# Patient Record
Sex: FEMALE | Race: OTHER | HISPANIC OR LATINO | URBAN - METROPOLITAN AREA
[De-identification: names, ages, dates, MRNs, and addresses within clinical notes are randomized per-mention and may not be internally consistent; named-entity substitution may affect disease eponyms.]

---

## 2017-01-20 ENCOUNTER — EMERGENCY (EMERGENCY)
Facility: HOSPITAL | Age: 37
LOS: 1 days | Discharge: HOME | End: 2017-01-20
Admitting: EMERGENCY MEDICINE
Payer: SELF-PAY

## 2017-01-20 DIAGNOSIS — R05 COUGH: ICD-10-CM

## 2017-01-20 DIAGNOSIS — J45.901 UNSPECIFIED ASTHMA WITH (ACUTE) EXACERBATION: ICD-10-CM

## 2017-01-20 DIAGNOSIS — J20.9 ACUTE BRONCHITIS, UNSPECIFIED: ICD-10-CM

## 2017-01-20 DIAGNOSIS — Z79.899 OTHER LONG TERM (CURRENT) DRUG THERAPY: ICD-10-CM

## 2017-01-20 PROCEDURE — 99284 EMERGENCY DEPT VISIT MOD MDM: CPT

## 2017-01-20 PROCEDURE — 71020: CPT | Mod: 26

## 2017-01-21 PROCEDURE — 94640 AIRWAY INHALATION TREATMENT: CPT

## 2017-01-21 PROCEDURE — 99284 EMERGENCY DEPT VISIT MOD MDM: CPT | Mod: 25

## 2017-01-21 PROCEDURE — 96372 THER/PROPH/DIAG INJ SC/IM: CPT

## 2017-01-21 PROCEDURE — 81025 URINE PREGNANCY TEST: CPT

## 2017-01-21 PROCEDURE — 71046 X-RAY EXAM CHEST 2 VIEWS: CPT

## 2017-05-17 ENCOUNTER — APPOINTMENT (OUTPATIENT)
Dept: PULMONOLOGY | Facility: CLINIC | Age: 37
End: 2017-05-17

## 2017-05-17 VITALS
BODY MASS INDEX: 29.45 KG/M2 | TEMPERATURE: 98.2 F | HEART RATE: 75 BPM | DIASTOLIC BLOOD PRESSURE: 76 MMHG | WEIGHT: 156 LBS | RESPIRATION RATE: 16 BRPM | HEIGHT: 61 IN | SYSTOLIC BLOOD PRESSURE: 110 MMHG | OXYGEN SATURATION: 98 %

## 2017-05-17 DIAGNOSIS — J45.909 UNSPECIFIED ASTHMA, UNCOMPLICATED: ICD-10-CM

## 2017-05-17 DIAGNOSIS — Z82.5 FAMILY HISTORY OF ASTHMA AND OTHER CHRONIC LOWER RESPIRATORY DISEASES: ICD-10-CM

## 2017-05-17 RX ORDER — FLUTICASONE PROPIONATE AND SALMETEROL XINAFOATE 115; 21 UG/1; UG/1
115-21 AEROSOL, METERED RESPIRATORY (INHALATION)
Qty: 1 | Refills: 3 | Status: ACTIVE | COMMUNITY
Start: 2017-05-17 | End: 1900-01-01

## 2017-05-17 RX ORDER — MONTELUKAST 10 MG/1
10 TABLET, FILM COATED ORAL
Qty: 30 | Refills: 6 | Status: ACTIVE | COMMUNITY
Start: 2017-05-17 | End: 1900-01-01

## 2017-05-17 RX ORDER — METHYLPREDNISOLONE 4 MG/1
4 TABLET ORAL
Qty: 21 | Refills: 0 | Status: COMPLETED | COMMUNITY
Start: 2017-05-08

## 2017-05-17 RX ORDER — FLUTICASONE PROPIONATE 50 UG/1
50 SPRAY, METERED NASAL
Qty: 16 | Refills: 0 | Status: ACTIVE | COMMUNITY
Start: 2017-05-08

## 2017-05-17 RX ORDER — ALBUTEROL 90 MCG
90 AEROSOL (GRAM) INHALATION
Refills: 0 | Status: ACTIVE | COMMUNITY

## 2017-05-23 ENCOUNTER — EMERGENCY (EMERGENCY)
Facility: HOSPITAL | Age: 37
LOS: 1 days | Discharge: HOME | End: 2017-05-23
Admitting: EMERGENCY MEDICINE
Payer: COMMERCIAL

## 2017-05-23 DIAGNOSIS — R06.00 DYSPNEA, UNSPECIFIED: ICD-10-CM

## 2017-05-23 DIAGNOSIS — J98.01 ACUTE BRONCHOSPASM: ICD-10-CM

## 2017-05-23 DIAGNOSIS — J45.909 UNSPECIFIED ASTHMA, UNCOMPLICATED: ICD-10-CM

## 2017-05-23 DIAGNOSIS — Z79.899 OTHER LONG TERM (CURRENT) DRUG THERAPY: ICD-10-CM

## 2017-05-23 DIAGNOSIS — J40 BRONCHITIS, NOT SPECIFIED AS ACUTE OR CHRONIC: ICD-10-CM

## 2017-05-23 PROCEDURE — 99284 EMERGENCY DEPT VISIT MOD MDM: CPT | Mod: 25

## 2017-05-24 PROCEDURE — 85027 COMPLETE CBC AUTOMATED: CPT

## 2017-05-24 PROCEDURE — 96365 THER/PROPH/DIAG IV INF INIT: CPT

## 2017-05-24 PROCEDURE — 94640 AIRWAY INHALATION TREATMENT: CPT

## 2017-05-24 PROCEDURE — 99284 EMERGENCY DEPT VISIT MOD MDM: CPT | Mod: 25

## 2017-05-24 PROCEDURE — 80048 BASIC METABOLIC PNL TOTAL CA: CPT

## 2017-05-24 PROCEDURE — 96368 THER/DIAG CONCURRENT INF: CPT

## 2017-10-28 ENCOUNTER — APPOINTMENT (OUTPATIENT)
Dept: ULTRASOUND IMAGING | Facility: HOSPITAL | Age: 37
End: 2017-10-28
Payer: COMMERCIAL

## 2017-10-28 ENCOUNTER — OUTPATIENT (OUTPATIENT)
Dept: OUTPATIENT SERVICES | Facility: HOSPITAL | Age: 37
LOS: 1 days | End: 2017-10-28
Payer: COMMERCIAL

## 2017-10-28 DIAGNOSIS — R10.9 UNSPECIFIED ABDOMINAL PAIN: ICD-10-CM

## 2017-10-28 DIAGNOSIS — Z00.8 ENCOUNTER FOR OTHER GENERAL EXAMINATION: ICD-10-CM

## 2017-10-28 PROCEDURE — 76700 US EXAM ABDOM COMPLETE: CPT

## 2017-10-28 PROCEDURE — 76700 US EXAM ABDOM COMPLETE: CPT | Mod: 26

## 2018-03-08 ENCOUNTER — APPOINTMENT (OUTPATIENT)
Dept: OTOLARYNGOLOGY | Facility: CLINIC | Age: 38
End: 2018-03-08
Payer: MEDICAID

## 2018-03-08 VITALS
WEIGHT: 156 LBS | BODY MASS INDEX: 29.45 KG/M2 | HEIGHT: 61 IN | SYSTOLIC BLOOD PRESSURE: 119 MMHG | DIASTOLIC BLOOD PRESSURE: 58 MMHG | HEART RATE: 72 BPM

## 2018-03-08 DIAGNOSIS — R68.89 OTHER GENERAL SYMPTOMS AND SIGNS: ICD-10-CM

## 2018-03-08 DIAGNOSIS — K21.9 GASTRO-ESOPHAGEAL REFLUX DISEASE W/OUT ESOPHAGITIS: ICD-10-CM

## 2018-03-08 DIAGNOSIS — J34.89 OTHER SPECIFIED DISORDERS OF NOSE AND NASAL SINUSES: ICD-10-CM

## 2018-03-08 DIAGNOSIS — F45.8 OTHER SOMATOFORM DISORDERS: ICD-10-CM

## 2018-03-08 DIAGNOSIS — R09.81 NASAL CONGESTION: ICD-10-CM

## 2018-03-08 PROCEDURE — 31231 NASAL ENDOSCOPY DX: CPT

## 2018-03-08 PROCEDURE — 99204 OFFICE O/P NEW MOD 45 MIN: CPT | Mod: 25

## 2018-03-08 PROCEDURE — 92557 COMPREHENSIVE HEARING TEST: CPT

## 2018-03-08 PROCEDURE — 92567 TYMPANOMETRY: CPT

## 2018-03-08 RX ORDER — FLUOCINONIDE 0.5 MG/ML
0.05 SOLUTION TOPICAL
Qty: 60 | Refills: 0 | Status: ACTIVE | COMMUNITY
Start: 2017-10-03

## 2018-03-08 RX ORDER — FLUTICASONE PROPIONATE 50 UG/1
50 SPRAY, METERED NASAL
Qty: 1 | Refills: 3 | Status: ACTIVE | COMMUNITY
Start: 2018-03-08 | End: 1900-01-01

## 2018-03-08 RX ORDER — KETOCONAZOLE 20.5 MG/ML
2 SHAMPOO, SUSPENSION TOPICAL
Qty: 120 | Refills: 0 | Status: ACTIVE | COMMUNITY
Start: 2017-10-03

## 2018-03-08 RX ORDER — RANITIDINE 150 MG/1
150 TABLET ORAL
Qty: 90 | Refills: 2 | Status: ACTIVE | COMMUNITY
Start: 2018-03-08 | End: 1900-01-01

## 2018-07-24 ENCOUNTER — FORM ENCOUNTER (OUTPATIENT)
Age: 38
End: 2018-07-24

## 2018-07-25 ENCOUNTER — OUTPATIENT (OUTPATIENT)
Dept: OUTPATIENT SERVICES | Facility: HOSPITAL | Age: 38
LOS: 1 days | End: 2018-07-25
Payer: COMMERCIAL

## 2018-07-25 ENCOUNTER — APPOINTMENT (OUTPATIENT)
Dept: MRI IMAGING | Facility: HOSPITAL | Age: 38
End: 2018-07-25
Payer: COMMERCIAL

## 2018-07-25 DIAGNOSIS — M23.91 UNSPECIFIED INTERNAL DERANGEMENT OF RIGHT KNEE: ICD-10-CM

## 2018-07-25 PROCEDURE — 73721 MRI JNT OF LWR EXTRE W/O DYE: CPT | Mod: 26,RT

## 2018-07-25 PROCEDURE — 73721 MRI JNT OF LWR EXTRE W/O DYE: CPT

## 2018-08-02 PROBLEM — M25.561 CHRONIC PAIN OF RIGHT KNEE: Status: ACTIVE | Noted: 2018-08-02

## 2018-08-08 ENCOUNTER — OUTPATIENT (OUTPATIENT)
Dept: OUTPATIENT SERVICES | Facility: HOSPITAL | Age: 38
LOS: 1 days | End: 2018-08-08
Payer: COMMERCIAL

## 2018-08-08 ENCOUNTER — APPOINTMENT (OUTPATIENT)
Dept: RADIOLOGY | Facility: HOSPITAL | Age: 38
End: 2018-08-08

## 2018-08-08 ENCOUNTER — APPOINTMENT (OUTPATIENT)
Dept: ORTHOPEDIC SURGERY | Facility: HOSPITAL | Age: 38
End: 2018-08-08

## 2018-08-08 VITALS
SYSTOLIC BLOOD PRESSURE: 113 MMHG | DIASTOLIC BLOOD PRESSURE: 74 MMHG | BODY MASS INDEX: 31.72 KG/M2 | WEIGHT: 168 LBS | HEART RATE: 58 BPM | HEIGHT: 61 IN

## 2018-08-08 DIAGNOSIS — M23.300 OTHER MENISCUS DERANGEMENTS, UNSPECIFIED LATERAL MENISCUS, RIGHT KNEE: ICD-10-CM

## 2018-08-08 DIAGNOSIS — M17.11 UNILATERAL PRIMARY OSTEOARTHRITIS, RIGHT KNEE: ICD-10-CM

## 2018-08-08 DIAGNOSIS — M25.561 PAIN IN RIGHT KNEE: ICD-10-CM

## 2018-08-08 DIAGNOSIS — G89.29 PAIN IN RIGHT KNEE: ICD-10-CM

## 2018-08-08 PROCEDURE — 73564 X-RAY EXAM KNEE 4 OR MORE: CPT | Mod: 26,RT

## 2018-08-15 ENCOUNTER — OUTPATIENT (OUTPATIENT)
Dept: OUTPATIENT SERVICES | Facility: HOSPITAL | Age: 38
LOS: 1 days | End: 2018-08-15

## 2018-08-15 VITALS
RESPIRATION RATE: 16 BRPM | OXYGEN SATURATION: 99 % | DIASTOLIC BLOOD PRESSURE: 64 MMHG | SYSTOLIC BLOOD PRESSURE: 112 MMHG | WEIGHT: 166.01 LBS | HEART RATE: 62 BPM | HEIGHT: 62 IN | TEMPERATURE: 98 F

## 2018-08-15 DIAGNOSIS — S83.241A OTHER TEAR OF MEDIAL MENISCUS, CURRENT INJURY, RIGHT KNEE, INITIAL ENCOUNTER: ICD-10-CM

## 2018-08-15 DIAGNOSIS — Z98.891 HISTORY OF UTERINE SCAR FROM PREVIOUS SURGERY: Chronic | ICD-10-CM

## 2018-08-15 DIAGNOSIS — J45.909 UNSPECIFIED ASTHMA, UNCOMPLICATED: ICD-10-CM

## 2018-08-15 DIAGNOSIS — Z91.013 ALLERGY TO SEAFOOD: ICD-10-CM

## 2018-08-15 DIAGNOSIS — S83.249A OTHER TEAR OF MEDIAL MENISCUS, CURRENT INJURY, UNSPECIFIED KNEE, INITIAL ENCOUNTER: ICD-10-CM

## 2018-08-15 LAB
HCT VFR BLD CALC: 37.3 % — SIGNIFICANT CHANGE UP (ref 34.5–45)
HGB BLD-MCNC: 12.3 G/DL — SIGNIFICANT CHANGE UP (ref 11.5–15.5)
MCHC RBC-ENTMCNC: 29.3 PG — SIGNIFICANT CHANGE UP (ref 27–34)
MCHC RBC-ENTMCNC: 33 % — SIGNIFICANT CHANGE UP (ref 32–36)
MCV RBC AUTO: 88.8 FL — SIGNIFICANT CHANGE UP (ref 80–100)
NRBC # FLD: 0 — SIGNIFICANT CHANGE UP
PLATELET # BLD AUTO: 342 K/UL — SIGNIFICANT CHANGE UP (ref 150–400)
PMV BLD: 10.2 FL — SIGNIFICANT CHANGE UP (ref 7–13)
RBC # BLD: 4.2 M/UL — SIGNIFICANT CHANGE UP (ref 3.8–5.2)
RBC # FLD: 12.7 % — SIGNIFICANT CHANGE UP (ref 10.3–14.5)
WBC # BLD: 7.84 K/UL — SIGNIFICANT CHANGE UP (ref 3.8–10.5)
WBC # FLD AUTO: 7.84 K/UL — SIGNIFICANT CHANGE UP (ref 3.8–10.5)

## 2018-08-15 NOTE — H&P PST ADULT - NEUROLOGICAL DETAILS
alert and oriented x 3/responds to verbal commands/sensation intact/normal strength/responds to pain

## 2018-08-15 NOTE — H&P PST ADULT - VISION (WITH CORRECTIVE LENSES IF THE PATIENT USUALLY WEARS THEM):
Corrective lens/Partially impaired: cannot see medication labels or newsprint, but can see obstacles in path, and the surrounding layout; can count fingers at arm's length

## 2018-08-15 NOTE — H&P PST ADULT - HISTORY OF PRESENT ILLNESS
37 year old female presents to [facility] for tear of medial meniscus right knee.  Pt is scheduled for right arthroscopy of the knee on 8/21/18.

## 2018-08-15 NOTE — H&P PST ADULT - NEGATIVE GASTROINTESTINAL SYMPTOMS
no melena/no constipation/no diarrhea/no change in bowel habits/no nausea/no vomiting/no abdominal pain

## 2018-08-15 NOTE — H&P PST ADULT - NSANTHOSAYNRD_GEN_A_CORE
No. NIKO screening performed.  STOP BANG Legend: 0-2 = LOW Risk; 3-4 = INTERMEDIATE Risk; 5-8 = HIGH Risk/never been tested

## 2018-08-15 NOTE — H&P PST ADULT - FAMILY HISTORY
Father  Still living? Yes, Estimated age: 71-80  Hypertension, Age at diagnosis: Age Unknown     Mother  Still living? Yes, Estimated age: 61-70  Hypertension, Age at diagnosis: Age Unknown  Family history of stroke, Age at diagnosis: Age Unknown

## 2018-08-15 NOTE — H&P PST ADULT - RS GEN PE MLT RESP DETAILS PC
airway patent/no rhonchi/breath sounds equal/good air movement/no rales/clear to auscultation bilaterally/respirations non-labored/no wheezes

## 2018-08-15 NOTE — H&P PST ADULT - NEGATIVE ENMT SYMPTOMS
no tinnitus/no nasal discharge/no nasal congestion/no ear pain/no throat pain/no dysphagia/no hearing difficulty/no vertigo/no sinus symptoms

## 2018-08-15 NOTE — H&P PST ADULT - NEGATIVE GENERAL GENITOURINARY SYMPTOMS
no dysuria/no renal colic/no incontinence/no nocturia/no urinary hesitancy/normal urinary frequency/no bladder infections/no hematuria

## 2018-08-15 NOTE — H&P PST ADULT - PROBLEM SELECTOR PLAN 1
Pt scheduled for right arthroscopy of the knee on 8/21/18.   Pre-op instructions given, patient verbalized understanding.   Pepcid given to patient for GI prophylaxis.   Chlorohexidine wash provided, instructions given.  Pt instructed to bring urine sample the morning of the surgery.

## 2018-08-20 ENCOUNTER — TRANSCRIPTION ENCOUNTER (OUTPATIENT)
Age: 38
End: 2018-08-20

## 2018-08-21 ENCOUNTER — OUTPATIENT (OUTPATIENT)
Dept: OUTPATIENT SERVICES | Facility: HOSPITAL | Age: 38
LOS: 1 days | Discharge: HOME | End: 2018-08-21

## 2018-08-21 VITALS
HEART RATE: 71 BPM | OXYGEN SATURATION: 98 % | DIASTOLIC BLOOD PRESSURE: 65 MMHG | RESPIRATION RATE: 14 BRPM | SYSTOLIC BLOOD PRESSURE: 115 MMHG

## 2018-08-21 VITALS
HEART RATE: 56 BPM | DIASTOLIC BLOOD PRESSURE: 65 MMHG | RESPIRATION RATE: 18 BRPM | TEMPERATURE: 98 F | OXYGEN SATURATION: 98 % | WEIGHT: 166.01 LBS | HEIGHT: 62 IN | SYSTOLIC BLOOD PRESSURE: 116 MMHG

## 2018-08-21 DIAGNOSIS — Z98.891 HISTORY OF UTERINE SCAR FROM PREVIOUS SURGERY: Chronic | ICD-10-CM

## 2018-08-21 DIAGNOSIS — S83.249A OTHER TEAR OF MEDIAL MENISCUS, CURRENT INJURY, UNSPECIFIED KNEE, INITIAL ENCOUNTER: ICD-10-CM

## 2018-08-21 RX ORDER — ASPIRIN/CALCIUM CARB/MAGNESIUM 324 MG
1 TABLET ORAL
Qty: 30 | Refills: 0
Start: 2018-08-21 | End: 2018-09-19

## 2018-08-21 NOTE — ASU DISCHARGE PLAN (ADULT/PEDIATRIC). - NOTIFY
Swelling that continues/Bleeding that does not stop/Pain not relieved by Medications/Numbness, color, or temperature change to extremity/Fever greater than 101/Persistent Nausea and Vomiting

## 2018-08-21 NOTE — ASU DISCHARGE PLAN (ADULT/PEDIATRIC). - NURSING INSTRUCTIONS
Read and follow doctor's post op instructions.  May weight bear on leg, may flex leg.  Remove dressing after 48 hours and apply band aids to portal.  Apply ice to knee.  May shower once dressing is removed.  Call and make follow up appointment.

## 2018-09-06 ENCOUNTER — APPOINTMENT (OUTPATIENT)
Dept: OTOLARYNGOLOGY | Facility: CLINIC | Age: 38
End: 2018-09-06

## 2018-09-17 ENCOUNTER — RESULT REVIEW (OUTPATIENT)
Age: 38
End: 2018-09-17

## 2018-09-18 PROBLEM — S83.241A OTHER TEAR OF MEDIAL MENISCUS, CURRENT INJURY, RIGHT KNEE, INITIAL ENCOUNTER: Chronic | Status: ACTIVE | Noted: 2018-08-15

## 2018-09-18 PROBLEM — J45.909 UNSPECIFIED ASTHMA, UNCOMPLICATED: Chronic | Status: ACTIVE | Noted: 2018-08-15

## 2018-10-03 ENCOUNTER — OUTPATIENT (OUTPATIENT)
Dept: OUTPATIENT SERVICES | Facility: HOSPITAL | Age: 38
LOS: 1 days | End: 2018-10-03
Payer: COMMERCIAL

## 2018-10-03 ENCOUNTER — APPOINTMENT (OUTPATIENT)
Dept: ULTRASOUND IMAGING | Facility: HOSPITAL | Age: 38
End: 2018-10-03
Payer: COMMERCIAL

## 2018-10-03 ENCOUNTER — APPOINTMENT (OUTPATIENT)
Dept: RADIOLOGY | Facility: HOSPITAL | Age: 38
End: 2018-10-03
Payer: COMMERCIAL

## 2018-10-03 DIAGNOSIS — Z00.8 ENCOUNTER FOR OTHER GENERAL EXAMINATION: ICD-10-CM

## 2018-10-03 DIAGNOSIS — Z98.891 HISTORY OF UTERINE SCAR FROM PREVIOUS SURGERY: Chronic | ICD-10-CM

## 2018-10-03 PROCEDURE — 76830 TRANSVAGINAL US NON-OB: CPT

## 2018-10-03 PROCEDURE — 76856 US EXAM PELVIC COMPLETE: CPT

## 2018-10-03 PROCEDURE — 72220 X-RAY EXAM SACRUM TAILBONE: CPT | Mod: 26

## 2018-10-03 PROCEDURE — 72220 X-RAY EXAM SACRUM TAILBONE: CPT

## 2018-10-03 PROCEDURE — 76830 TRANSVAGINAL US NON-OB: CPT | Mod: 26

## 2018-10-03 PROCEDURE — 76856 US EXAM PELVIC COMPLETE: CPT | Mod: 26

## 2018-10-22 ENCOUNTER — OUTPATIENT (OUTPATIENT)
Dept: OUTPATIENT SERVICES | Facility: HOSPITAL | Age: 38
LOS: 1 days | End: 2018-10-22
Payer: COMMERCIAL

## 2018-10-22 ENCOUNTER — APPOINTMENT (OUTPATIENT)
Dept: CT IMAGING | Facility: HOSPITAL | Age: 38
End: 2018-10-22
Payer: COMMERCIAL

## 2018-10-22 DIAGNOSIS — Z98.891 HISTORY OF UTERINE SCAR FROM PREVIOUS SURGERY: Chronic | ICD-10-CM

## 2018-10-22 DIAGNOSIS — Z00.8 ENCOUNTER FOR OTHER GENERAL EXAMINATION: ICD-10-CM

## 2018-10-22 PROCEDURE — 74177 CT ABD & PELVIS W/CONTRAST: CPT | Mod: 26

## 2018-10-22 PROCEDURE — 74177 CT ABD & PELVIS W/CONTRAST: CPT

## 2018-11-07 ENCOUNTER — APPOINTMENT (OUTPATIENT)
Dept: ULTRASOUND IMAGING | Facility: HOSPITAL | Age: 38
End: 2018-11-07
Payer: COMMERCIAL

## 2018-11-07 ENCOUNTER — OUTPATIENT (OUTPATIENT)
Dept: OUTPATIENT SERVICES | Facility: HOSPITAL | Age: 38
LOS: 1 days | End: 2018-11-07
Payer: COMMERCIAL

## 2018-11-07 DIAGNOSIS — Z98.891 HISTORY OF UTERINE SCAR FROM PREVIOUS SURGERY: Chronic | ICD-10-CM

## 2018-11-07 DIAGNOSIS — Z00.8 ENCOUNTER FOR OTHER GENERAL EXAMINATION: ICD-10-CM

## 2018-11-07 PROCEDURE — 76700 US EXAM ABDOM COMPLETE: CPT | Mod: 26

## 2018-11-07 PROCEDURE — 76700 US EXAM ABDOM COMPLETE: CPT

## 2019-11-08 ENCOUNTER — APPOINTMENT (OUTPATIENT)
Dept: MRI IMAGING | Facility: HOSPITAL | Age: 39
End: 2019-11-08
Payer: COMMERCIAL

## 2019-11-08 ENCOUNTER — OUTPATIENT (OUTPATIENT)
Dept: OUTPATIENT SERVICES | Facility: HOSPITAL | Age: 39
LOS: 1 days | End: 2019-11-08
Payer: COMMERCIAL

## 2019-11-08 DIAGNOSIS — M54.32 SCIATICA, LEFT SIDE: ICD-10-CM

## 2019-11-08 DIAGNOSIS — R20.0 ANESTHESIA OF SKIN: ICD-10-CM

## 2019-11-08 DIAGNOSIS — M54.31 SCIATICA, RIGHT SIDE: ICD-10-CM

## 2019-11-08 DIAGNOSIS — Z98.891 HISTORY OF UTERINE SCAR FROM PREVIOUS SURGERY: Chronic | ICD-10-CM

## 2019-11-08 PROCEDURE — 72148 MRI LUMBAR SPINE W/O DYE: CPT | Mod: 26

## 2019-11-08 PROCEDURE — 72141 MRI NECK SPINE W/O DYE: CPT | Mod: 26

## 2019-11-08 PROCEDURE — 72148 MRI LUMBAR SPINE W/O DYE: CPT

## 2019-11-08 PROCEDURE — 72141 MRI NECK SPINE W/O DYE: CPT

## 2020-01-01 ENCOUNTER — EMERGENCY (EMERGENCY)
Facility: HOSPITAL | Age: 40
LOS: 1 days | Discharge: HOME | End: 2020-01-01
Attending: EMERGENCY MEDICINE | Admitting: EMERGENCY MEDICINE
Payer: COMMERCIAL

## 2020-01-01 VITALS
OXYGEN SATURATION: 97 % | RESPIRATION RATE: 16 BRPM | DIASTOLIC BLOOD PRESSURE: 85 MMHG | HEART RATE: 60 BPM | SYSTOLIC BLOOD PRESSURE: 126 MMHG

## 2020-01-01 VITALS
HEART RATE: 62 BPM | SYSTOLIC BLOOD PRESSURE: 155 MMHG | OXYGEN SATURATION: 97 % | HEIGHT: 64 IN | RESPIRATION RATE: 17 BRPM | WEIGHT: 165.35 LBS | DIASTOLIC BLOOD PRESSURE: 81 MMHG | TEMPERATURE: 98 F

## 2020-01-01 DIAGNOSIS — Z98.891 HISTORY OF UTERINE SCAR FROM PREVIOUS SURGERY: Chronic | ICD-10-CM

## 2020-01-01 DIAGNOSIS — R51 HEADACHE: ICD-10-CM

## 2020-01-01 LAB
ANION GAP SERPL CALC-SCNC: 9 MMOL/L — SIGNIFICANT CHANGE UP (ref 5–17)
APPEARANCE UR: CLEAR — SIGNIFICANT CHANGE UP
BASOPHILS # BLD AUTO: 0.05 K/UL — SIGNIFICANT CHANGE UP (ref 0–0.2)
BASOPHILS NFR BLD AUTO: 0.7 % — SIGNIFICANT CHANGE UP (ref 0–2)
BILIRUB UR-MCNC: NEGATIVE — SIGNIFICANT CHANGE UP
BUN SERPL-MCNC: 11 MG/DL — SIGNIFICANT CHANGE UP (ref 7–23)
CALCIUM SERPL-MCNC: 8.3 MG/DL — LOW (ref 8.4–10.5)
CHLORIDE SERPL-SCNC: 108 MMOL/L — SIGNIFICANT CHANGE UP (ref 96–108)
CO2 SERPL-SCNC: 22 MMOL/L — SIGNIFICANT CHANGE UP (ref 22–31)
COLOR SPEC: YELLOW — SIGNIFICANT CHANGE UP
CREAT SERPL-MCNC: 0.58 MG/DL — SIGNIFICANT CHANGE UP (ref 0.5–1.3)
DIFF PNL FLD: ABNORMAL
EOSINOPHIL # BLD AUTO: 0.31 K/UL — SIGNIFICANT CHANGE UP (ref 0–0.5)
EOSINOPHIL NFR BLD AUTO: 4.2 % — SIGNIFICANT CHANGE UP (ref 0–6)
GLUCOSE SERPL-MCNC: 99 MG/DL — SIGNIFICANT CHANGE UP (ref 70–99)
GLUCOSE UR QL: NEGATIVE — SIGNIFICANT CHANGE UP
HCT VFR BLD CALC: 33.5 % — LOW (ref 34.5–45)
HGB BLD-MCNC: 11.3 G/DL — LOW (ref 11.5–15.5)
IMM GRANULOCYTES NFR BLD AUTO: 0.3 % — SIGNIFICANT CHANGE UP (ref 0–1.5)
KETONES UR-MCNC: NEGATIVE — SIGNIFICANT CHANGE UP
LEUKOCYTE ESTERASE UR-ACNC: NEGATIVE — SIGNIFICANT CHANGE UP
LYMPHOCYTES # BLD AUTO: 3.71 K/UL — HIGH (ref 1–3.3)
LYMPHOCYTES # BLD AUTO: 49.7 % — HIGH (ref 13–44)
MCHC RBC-ENTMCNC: 30.1 PG — SIGNIFICANT CHANGE UP (ref 27–34)
MCHC RBC-ENTMCNC: 33.7 GM/DL — SIGNIFICANT CHANGE UP (ref 32–36)
MCV RBC AUTO: 89.1 FL — SIGNIFICANT CHANGE UP (ref 80–100)
MONOCYTES # BLD AUTO: 0.85 K/UL — SIGNIFICANT CHANGE UP (ref 0–0.9)
MONOCYTES NFR BLD AUTO: 11.4 % — SIGNIFICANT CHANGE UP (ref 2–14)
NEUTROPHILS # BLD AUTO: 2.52 K/UL — SIGNIFICANT CHANGE UP (ref 1.8–7.4)
NEUTROPHILS NFR BLD AUTO: 33.7 % — LOW (ref 43–77)
NITRITE UR-MCNC: NEGATIVE — SIGNIFICANT CHANGE UP
NRBC # BLD: 0 /100 WBCS — SIGNIFICANT CHANGE UP (ref 0–0)
PH UR: 6 — SIGNIFICANT CHANGE UP (ref 5–8)
PLATELET # BLD AUTO: 263 K/UL — SIGNIFICANT CHANGE UP (ref 150–400)
POTASSIUM SERPL-MCNC: 4 MMOL/L — SIGNIFICANT CHANGE UP (ref 3.5–5.3)
POTASSIUM SERPL-SCNC: 4 MMOL/L — SIGNIFICANT CHANGE UP (ref 3.5–5.3)
PROT UR-MCNC: NEGATIVE — SIGNIFICANT CHANGE UP
RBC # BLD: 3.76 M/UL — LOW (ref 3.8–5.2)
RBC # FLD: 13.2 % — SIGNIFICANT CHANGE UP (ref 10.3–14.5)
SODIUM SERPL-SCNC: 139 MMOL/L — SIGNIFICANT CHANGE UP (ref 135–145)
SP GR SPEC: 1.02 — SIGNIFICANT CHANGE UP (ref 1.01–1.02)
UROBILINOGEN FLD QL: NEGATIVE — SIGNIFICANT CHANGE UP
WBC # BLD: 7.46 K/UL — SIGNIFICANT CHANGE UP (ref 3.8–10.5)
WBC # FLD AUTO: 7.46 K/UL — SIGNIFICANT CHANGE UP (ref 3.8–10.5)

## 2020-01-01 PROCEDURE — 80048 BASIC METABOLIC PNL TOTAL CA: CPT

## 2020-01-01 PROCEDURE — 99284 EMERGENCY DEPT VISIT MOD MDM: CPT

## 2020-01-01 PROCEDURE — 81001 URINALYSIS AUTO W/SCOPE: CPT

## 2020-01-01 PROCEDURE — 96374 THER/PROPH/DIAG INJ IV PUSH: CPT

## 2020-01-01 PROCEDURE — 96375 TX/PRO/DX INJ NEW DRUG ADDON: CPT

## 2020-01-01 PROCEDURE — 81025 URINE PREGNANCY TEST: CPT

## 2020-01-01 PROCEDURE — 85027 COMPLETE CBC AUTOMATED: CPT

## 2020-01-01 PROCEDURE — 99284 EMERGENCY DEPT VISIT MOD MDM: CPT | Mod: 25

## 2020-01-01 RX ORDER — ONDANSETRON 8 MG/1
4 TABLET, FILM COATED ORAL ONCE
Refills: 0 | Status: COMPLETED | OUTPATIENT
Start: 2020-01-01 | End: 2020-01-01

## 2020-01-01 RX ORDER — KETOROLAC TROMETHAMINE 30 MG/ML
30 SYRINGE (ML) INJECTION ONCE
Refills: 0 | Status: DISCONTINUED | OUTPATIENT
Start: 2020-01-01 | End: 2020-01-01

## 2020-01-01 RX ORDER — SODIUM CHLORIDE 9 MG/ML
1000 INJECTION INTRAMUSCULAR; INTRAVENOUS; SUBCUTANEOUS ONCE
Refills: 0 | Status: COMPLETED | OUTPATIENT
Start: 2020-01-01 | End: 2020-01-01

## 2020-01-01 RX ORDER — IBUPROFEN 200 MG
1 TABLET ORAL
Qty: 30 | Refills: 0
Start: 2020-01-01

## 2020-01-01 RX ORDER — ACETAMINOPHEN 500 MG
975 TABLET ORAL ONCE
Refills: 0 | Status: COMPLETED | OUTPATIENT
Start: 2020-01-01 | End: 2020-01-01

## 2020-01-01 RX ORDER — MECLIZINE HCL 12.5 MG
25 TABLET ORAL ONCE
Refills: 0 | Status: COMPLETED | OUTPATIENT
Start: 2020-01-01 | End: 2020-01-01

## 2020-01-01 RX ORDER — MECLIZINE HCL 12.5 MG
1 TABLET ORAL
Qty: 20 | Refills: 0
Start: 2020-01-01

## 2020-01-01 RX ADMIN — Medication 30 MILLIGRAM(S): at 05:36

## 2020-01-01 RX ADMIN — SODIUM CHLORIDE 1000 MILLILITER(S): 9 INJECTION INTRAMUSCULAR; INTRAVENOUS; SUBCUTANEOUS at 04:58

## 2020-01-01 RX ADMIN — Medication 975 MILLIGRAM(S): at 05:36

## 2020-01-01 RX ADMIN — ONDANSETRON 4 MILLIGRAM(S): 8 TABLET, FILM COATED ORAL at 04:57

## 2020-01-01 RX ADMIN — Medication 30 MILLIGRAM(S): at 04:58

## 2020-01-01 RX ADMIN — Medication 25 MILLIGRAM(S): at 04:58

## 2020-01-01 RX ADMIN — Medication 975 MILLIGRAM(S): at 04:58

## 2020-01-01 NOTE — ED PROVIDER NOTE - NSFOLLOWUPINSTRUCTIONS_ED_ALL_ED_FT
Desvanecimiento    LO QUE NECESITA SABER:    El desvanecimiento es la sensación de que se puede desmayar, wali no es así. Lisa latido cardíaco puede ser rápido o sentir que lisa corazón le está aleteando. La sensación de desvanecimiento puede ocurrir cuando se heaven ciertos medicamentos, wade aquellos para bajar lisa presión arterial. Otras causas comunes son la deshidratación, un bajón de sodio o de azúcar en la noah, un ritmo cardíaco anormal y la ansiedad.    INSTRUCCIONES SOBRE EL [facility]ITAL[facility]:    Regrese a la reanna de emergencias si:    Tiene dolor repentino en el pecho.      Usted tiene dificultad para respirar o le falta el aire.      Usted tiene cambios en la visión, está sudando y tiene náuseas mientras está sentado o acostado.      Usted está acalorado y siente las palpitaciones de lisa corazón.      Se desmaya.    Comuníquese con lisa médico si:    Se siente mareado con frecuencia.      Lisa corazón late más rápido o más despacio que lo acostumbrado.      Usted tiene preguntas o inquietudes acerca de lisa condición o cuidado.    Acuda a krista consultas de control con lisa médico según le indicaron.Usted podría necesitar de exámenes adicionales para averiguar la causa de krista desvanecimiento o aturdimiento. Los exámenes ayudarán a lisa médico a planificar el mejor tratamiento para usted. Anote krista preguntas para que se acuerde de hacerlas malachi krista visitas.    Cuidados personales:Dialogue con lisa médico sobre las siguientes recomendaciones y otras formas de controlar krista síntomas:    Recuéstesecuando tenga la sensación de desvanecimiento, al sentir que lisa garganta se está cerrando o lisa visión cambia. Eleve krista piernas por encima del nivel de lisa corazón.      Póngase de pie lentamente.Siéntese en el borde de la cama o del sofá por unos minutos antes de ponerse de pie.      Respire lenta y profundamente cuando se sienta mareado.[place] puede ayudar a disminuir la sensación de que se va a desmayar.      Pregunte si es necesario evitar las duchas o tinas calientes y saunas.Estos podrían empeorar krista síntomas.    Esté atento a los signos de bajo nivel de azúcar en la noah:Los signos incluyen hambre, nerviosismo, sudoración y latidos cardíacos rápidos o palpitantes. Consulte con lisa médico sobre métodos para mantener estable lisa nivel de azúcar en la noah.    Revise lisa presión arterial con frecuencia.Usted debe hacer ésto especialmente si manjinder medicamentos para bajar la presión arterial. Revise lisa presión arterial cuando esté acostado y cuando esté de pie. Pregunte con que frecuencia debe tomarse la presión malachi el día. Mantenga un registro de los valores numéricos de lisa presión arterial. Lisa médico puede usar la información del registro wade shima base para planificar lisa tratamiento.    Mantenga un registro de los episodios de la sensación de desvanecimiento:Incluya los síntomas y la actividad que realiza antes y después del episodio. El registro puede asistir a lisa médico a determinar la causa de lisa debilidad y ayudarlo a controlar krista episodios.    Dolor de ita regular    LO QUE NECESITA SABER:    El dolor de ita puede ser leve o intenso. Las causas comunes incluyen el estrés, medicamentos y lesiones en la ita. Problemas de sueño, alergias y cambios hormonales también pueden causar renae de ita. Puede que usted sufra de renae de ita frecuentes sin que se conozca lisa causa. Es probable que el dolor empiece en otra parte de lisa cuerpo y pase a lisa ita. El dolor de ita también puede moverse hacia otras partes de lisa cuerpo. Un dolor de ita puede causar otros síntomas, wade náusea y vómito. Un dolor de ita martha puede incluso ser shima señal de derrame cerebral u otro problema tc que necesita de tratamiento inmediato.    INSTRUCCIONES SOBRE [facility]ITAL[facility]IA:    Llame al 911 en isabelle de presentar lo siguiente:    Usted tiene alguno de los siguientes signos de derrame cerebral:   Adormecimiento o caída de un lado de lisa aby      Debilidad en un brazo o shima pierna      Confusión o debilidad para hablar      Mareos o dolor de ita intenso, o pérdida de la visión.        Regrese a la reanna de emergencias si:    Usted tiene un dolor de ita con rigidez de alisia y fiebre.      Usted tiene un dolor de ita nino y está vomitando.      Usted tiene dolor severo que no se nu después de ankita krista medicamentos para el dolor.      Usted tiene dolor de ita y el dolor empeora cuando usted héctor hacia la clemente.      Usted tiene dolor de ita y cambios en la vista, wade visión borrosa.      Usted tiene dolor de ita y está olvidadizo o confundido.    Comuníquese con lisa médico si:    Usted tiene dolor de ita todos los días y no se nu aun después de tratarlo.      Krista renae de ita cambian u ocurren nuevos síntomas cuando tiene dolor de ita.      Otras personas con las que usted vive o trabaja también tienen renae de ita.      Usted tiene preguntas o inquietudes acerca de lisa condición o cuidado.    Medicamentos:Es posible que usted necesite alguno de los siguientes:     Los medicamentosPueden darse medicamentospara prevenir o tratar el dolor de ita. No espere hasta que el dolor sea severo para ankita lisa medicamento. Pregunte al médico cómo debe ankita isabella medicamento de forma deng.      Los ROSCOE,wade el ibuprofeno, ayudan a disminuir la inflamación, el dolor y la fiebre. Isabella medicamento está disponible con o sin shima receta médica. Los ROSCOE pueden causar sangrado estomacal o problemas renales en ciertas personas. Si usted esta tomando un anticoágulante, siempre pregunte si los AINEs son seguros para usted. Siempre javid la etiqueta de isabella medicamento y siga las instrucciones. No administre isabella medicamento a niños menores de 6 meses de jonathon sin antes obtener la autorización de lisa médico.      Acetaminofénalivia el dolor y baja la fiebre. Está disponible sin receta médica. Pregunte la cantidad y la frecuencia con que debe tomarlos. Siga las indicaciones. Javid las etiquetas de todos los demás medicamentos que esté usando para saber si también contienen acetaminofén, o pregunte a lisa médico o farmacéutico. El acetaminofén puede causar daño en el hígado cuando no se manjinder de forma correcta. No use más de 4 gramos (4000 miligramos) en total de acetaminofeno en un día.      Los medicamentos contra las náuseaspueden darse para calmar lisa estómago y ayudar a prevenir los vómitos.      [place] krista medicamentos wade se le haya indicado.Consulte con lisa médico si usted loraine que lisa medicamento no le está ayudando o si presenta efectos secundarios. Infórmele si es alérgico a algún medicamento. Mantenga shima lista actualizada de los medicamentos, las vitaminas y los productos herbales que manjinder. Incluya los siguientes datos de los medicamentos: cantidad, frecuencia y motivo de administración. Traiga con usted la lista o los envases de las píldoras a krista citas de seguimiento. Lleve la lista de los medicamentos con usted en isabelle de shima emergencia.    El manejo de krista síntomas:    Descanse en shima habitación oscura y tranquila.[place] wade consecuencia podría ayudar a disminuir lisa dolor.      Aplique calor o hielo según lo indicado.El calor o el hielo pueden ayudar a disminuir el dolor o los espasmos musculares. Aplíquese calor o hielo en el área por 20 minutos cada 2 horas por tantos días wade se lo recomienden. Es probable que lisa médico le recomiende que alterne entre calor y hielo.      Relaje krista músculos para ayudar a aliviar lisa dolor de ita.Acuéstese en shima posición cómoda y cierre krista ojos. Relaje krista músculos lentamente. Comience por los dedos de los pies y avance hacia arriba al meño de lisa cuerpo. Un masaje o baño en agua tibia también pueden ayudar a relajar krista músculos.    Mantenga un registro de krista renae de ita:Escriba en el diario las fechas y las horas en que usted sufre un dolor de ita y lo que estaba haciendo antes de que empezara. Registre también lo que comió y bebió malachi las 24 horas previas a que comenzara el dolor de ita. [place] puede ayudar a lisa médico a encontrar la causa de krista renae de ita y así poder crear un plan de tratamiento. Krista anotaciones también pueden ayudarle a evitar lo que provoca krista renae de ita o manejar krista síntomas.    Duerma suficiente:Usted debería dormir entre 8 y 10 horas cada noche. Establezca un horario para dormir. Acuéstese y levántese a la misma hora todos los días. Puede resultarle útil hacer algo relajante antes de acostarse. No belinda televisión antes de acostarse.    No fume:La nicotina y otras sustancias químicas en los cigarrillos y cigarros pueden provocar un dolor de ita tensional o empeorarlo. Pida información a lisa médico si usted actualmente fuma y necesita ayuda para dejar de fumar. Los cigarrillos electrónicos o el tabaco sin humo igualmente contienen nicotina. Consulte con lisa médico antes de utilizar estos productos.    [place] líquidos según krista indicaciones:Es probable que usted necesite ankita más líquido para prevenir la deshidratación. La deshidratación puede causar renae de ita. Pregunte a lisa médico sobre la cantidad de líquido que necesita ankita todos los jaya y cuáles le recomienda.    Limite la cafeína y las bebidas alcohólicas según se le haya indicado:Krista renae de ita pueden ser causados por la cafeína o el alcohol. Es probable que usted también desarrolle un dolor de ita si manjinder cafeína regularmente y myesha de tomarla repentinamente.    Consuma alimentos saludables y variados:No se salte ninguna comida. [place] demasiado poco puede causar un dolor de ita. Incluya frutas, vegetales, panes integrales, productos lácteos bajos en grasas, frijoles, keith magras y pescado. No coma alimentos que provoquen krista renae de ita, wade chocolate y vino tinto. Alimentos que contienen gluten, nitratos, monosodio glutamato (MSG) o azúcares artificiales también pueden llegar a causar un dolor de ita.    Acuda a krista consultas de control con lisa médico según le indicaron.Anote krista preguntas para que se acuerde de hacerlas malachi krista visitas.

## 2020-01-01 NOTE — ED PROVIDER NOTE - PATIENT PORTAL LINK FT
You can access the FollowMyHealth Patient Portal offered by [facility] by registering at the following website: [email or web address]. By joining [facility]’s FollowMyHealth portal, you will also be able to view your health information using other applications (apps) compatible with our system.

## 2020-01-01 NOTE — ED PROVIDER NOTE - CARE PROVIDER_API CALL
Adalid Johnson)  Neurology  [address] [place] [address], [address]  [place] [address]  Phone: [phone]  Fax: [phone]  Follow Up Time: 1-3 Days

## 2020-01-01 NOTE — ED PROVIDER NOTE - CLINICAL SUMMARY MEDICAL DECISION MAKING FREE TEXT BOX
headache, dizziness.  with normal neuro exam/ vitals. ?bppv.  r/o anemia, dehydration, electrolyte abnormality. check labs. give iv fluids, zofran, meclizine. reassess headache, dizziness.  with normal neuro exam/ vitals. ?bppv.  r/o anemia, dehydration, electrolyte abnormality. check labs. give iv fluids, zofran, meclizine. reassess    0630. labs unremarkable. pt feels much better, like "a new person". well appearing

## 2020-01-01 NOTE — ED PROVIDER NOTE - OBJECTIVE STATEMENT
pt c/o generalized headache, moderate, at 1am today assoc c dizziness, lightheaded and also vertiginous. assoc c nausea, vomited x 1 nbnb. no fever/chills. no cp/sob. no weakness, numbness, speech or vision changes. no head trauma

## 2020-01-01 NOTE — ED ADULT NURSE NOTE - OBJECTIVE STATEMENT
Patient w/[facility] presents to ED complaining of headache with nausea and difficulty breathing since approximately 1 am. Patient verbalizes one episode of vomiting approximately 2 hours ago. Patient denies any recent sick contacts.

## 2020-01-25 ENCOUNTER — APPOINTMENT (OUTPATIENT)
Dept: MRI IMAGING | Facility: HOSPITAL | Age: 40
End: 2020-01-25

## 2020-02-24 NOTE — ASU PREOP CHECKLIST - TYPE OF SOLUTION
LR@30cc/hr O-L Flap Text: The defect edges were debeveled with a #15 scalpel blade.  Given the location of the defect, shape of the defect and the proximity to free margins an O-L flap was deemed most appropriate.  Using a sterile surgical marker, an appropriate advancement flap was drawn incorporating the defect and placing the expected incisions within the relaxed skin tension lines where possible.    The area thus outlined was incised deep to adipose tissue with a #15 scalpel blade.  The skin margins were undermined to an appropriate distance in all directions utilizing iris scissors.

## 2020-03-10 ENCOUNTER — RESULT REVIEW (OUTPATIENT)
Age: 40
End: 2020-03-10

## 2021-03-22 ENCOUNTER — APPOINTMENT (OUTPATIENT)
Dept: MRI IMAGING | Facility: HOSPITAL | Age: 41
End: 2021-03-22
Payer: COMMERCIAL

## 2021-03-22 ENCOUNTER — APPOINTMENT (OUTPATIENT)
Dept: RADIOLOGY | Facility: HOSPITAL | Age: 41
End: 2021-03-22
Payer: COMMERCIAL

## 2021-03-22 ENCOUNTER — OUTPATIENT (OUTPATIENT)
Dept: OUTPATIENT SERVICES | Facility: HOSPITAL | Age: 41
LOS: 1 days | End: 2021-03-22
Payer: COMMERCIAL

## 2021-03-22 DIAGNOSIS — Z98.891 HISTORY OF UTERINE SCAR FROM PREVIOUS SURGERY: Chronic | ICD-10-CM

## 2021-03-22 DIAGNOSIS — Z00.8 ENCOUNTER FOR OTHER GENERAL EXAMINATION: ICD-10-CM

## 2021-03-22 PROCEDURE — 71046 X-RAY EXAM CHEST 2 VIEWS: CPT

## 2021-03-22 PROCEDURE — 70551 MRI BRAIN STEM W/O DYE: CPT | Mod: 26

## 2021-03-22 PROCEDURE — 71046 X-RAY EXAM CHEST 2 VIEWS: CPT | Mod: 26

## 2021-03-22 PROCEDURE — 70551 MRI BRAIN STEM W/O DYE: CPT

## 2021-03-26 ENCOUNTER — APPOINTMENT (OUTPATIENT)
Dept: CARDIOLOGY | Facility: CLINIC | Age: 41
End: 2021-03-26
Payer: COMMERCIAL

## 2021-03-26 VITALS
SYSTOLIC BLOOD PRESSURE: 109 MMHG | TEMPERATURE: 98.2 F | WEIGHT: 168 LBS | DIASTOLIC BLOOD PRESSURE: 74 MMHG | HEART RATE: 75 BPM | OXYGEN SATURATION: 98 % | RESPIRATION RATE: 16 BRPM | HEIGHT: 61 IN | BODY MASS INDEX: 31.72 KG/M2

## 2021-03-26 DIAGNOSIS — R07.9 CHEST PAIN, UNSPECIFIED: ICD-10-CM

## 2021-03-26 DIAGNOSIS — R00.2 PALPITATIONS: ICD-10-CM

## 2021-03-26 PROCEDURE — 93000 ELECTROCARDIOGRAM COMPLETE: CPT

## 2021-03-26 PROCEDURE — 99072 ADDL SUPL MATRL&STAF TM PHE: CPT

## 2021-03-26 PROCEDURE — 99203 OFFICE O/P NEW LOW 30 MIN: CPT

## 2021-03-26 NOTE — PHYSICAL EXAM
[General Appearance - Well Developed] : well developed [Normal Appearance] : normal appearance [Well Groomed] : well groomed [General Appearance - Well Nourished] : well nourished [No Deformities] : no deformities [General Appearance - In No Acute Distress] : no acute distress [Normal Conjunctiva] : the conjunctiva exhibited no abnormalities [Eyelids - No Xanthelasma] : the eyelids demonstrated no xanthelasmas [Normal Oral Mucosa] : normal oral mucosa [No Oral Pallor] : no oral pallor [No Oral Cyanosis] : no oral cyanosis [Normal Jugular Venous A Waves Present] : normal jugular venous A waves present [Normal Jugular Venous V Waves Present] : normal jugular venous V waves present [No Jugular Venous Romero A Waves] : no jugular venous romero A waves [Heart Rate And Rhythm] : heart rate and rhythm were normal [Heart Sounds] : normal S1 and S2 [Murmurs] : no murmurs present [Respiration, Rhythm And Depth] : normal respiratory rhythm and effort [Exaggerated Use Of Accessory Muscles For Inspiration] : no accessory muscle use [Auscultation Breath Sounds / Voice Sounds] : lungs were clear to auscultation bilaterally [Abdomen Soft] : soft [Abdomen Tenderness] : non-tender [Abdomen Mass (___ Cm)] : no abdominal mass palpated [Abnormal Walk] : normal gait [Gait - Sufficient For Exercise Testing] : the gait was sufficient for exercise testing [Nail Clubbing] : no clubbing of the fingernails [Cyanosis, Localized] : no localized cyanosis [Petechial Hemorrhages (___cm)] : no petechial hemorrhages [Skin Color & Pigmentation] : normal skin color and pigmentation [] : no rash [No Venous Stasis] : no venous stasis [Skin Lesions] : no skin lesions [No Skin Ulcers] : no skin ulcer [No Xanthoma] : no  xanthoma was observed [Oriented To Time, Place, And Person] : oriented to person, place, and time [Affect] : the affect was normal [Mood] : the mood was normal [No Anxiety] : not feeling anxious

## 2021-03-26 NOTE — REASON FOR VISIT
[Consultation] : a consultation regarding [Chest Pain] : chest pain [Coronary Artery Disease] : coronary artery disease [FreeTextEntry1] : Carole  comes today for clarification of her  overall cardiovascular risk. she was advised to undergo a complete cardiac evaluation. she denies current chest pains shortness of breath or loss of consciousnes. how would she does describe recent chest pains across her anterior chest.\par The quality of the pain is localized to the anterior chest. The severity is mild to moderate.\par The  duration and  the timing  is short lived in  the context of progressive weight gain.\par there are no Modifying factors or associated signs and symptoms. she admits to anxiety. her mother suffered myocardial infarction and stroke and a clot to her brain . there is a strong family history\par \par [employer]  [id number]

## 2021-03-26 NOTE — DISCUSSION/SUMMARY
[FreeTextEntry1] : I have asked Carole to undergo detailed cardiac testing in order to evaluate her overall cardiovascular risk. An assessment of both structural and functional heart disease was recommended to the patient.\par In this regard, a stress test was advised to the patient.I await the upcoming noninvasive cardiac testing in order to assess her overall cardiovascular risk.We discussed the pros and cons of plain treadmill stress testing nuclear stress testing and angiography including a sensitivity analysis.We would consider advancing towards a cardiac CTA based upon the plain stress test result. she prefers to avoid radiation but asks for the best test possible\par \par We have reviewed the current ACC guidelines and using the pooled cohort equation his cardiac risk over the near term 10 years is  to be determined    \par \par More than half of the face to face encounter of 45 minutes   was spent in counseling the patient with respect to  cardiovascular risk\par \par Quality measures \par Tobacco intervention not indicated\par Statin for prevention of cardiovascular disease to be determined\par Hypertension compensated\par Aspirin for ischemic vascular disease to be determined\par Tobacco screening cessation and intervention not indicated\par \par Medical necessity\par This is a high encounter based upon two or more chronic illnesses with mild exacerbation requiring further management and evaluation.   .\par \par \par \par \par

## 2021-04-01 ENCOUNTER — APPOINTMENT (OUTPATIENT)
Dept: OTOLARYNGOLOGY | Facility: CLINIC | Age: 41
End: 2021-04-01
Payer: COMMERCIAL

## 2021-04-01 VITALS
HEART RATE: 74 BPM | WEIGHT: 168 LBS | SYSTOLIC BLOOD PRESSURE: 122 MMHG | OXYGEN SATURATION: 98 % | DIASTOLIC BLOOD PRESSURE: 74 MMHG | BODY MASS INDEX: 31.72 KG/M2 | TEMPERATURE: 97.3 F | HEIGHT: 61 IN

## 2021-04-01 DIAGNOSIS — H92.01 OTALGIA, RIGHT EAR: ICD-10-CM

## 2021-04-01 DIAGNOSIS — J35.8 OTHER CHRONIC DISEASES OF TONSILS AND ADENOIDS: ICD-10-CM

## 2021-04-01 DIAGNOSIS — H93.11 TINNITUS, RIGHT EAR: ICD-10-CM

## 2021-04-01 PROCEDURE — 99072 ADDL SUPL MATRL&STAF TM PHE: CPT

## 2021-04-01 PROCEDURE — 99204 OFFICE O/P NEW MOD 45 MIN: CPT

## 2021-04-01 NOTE — CONSULT LETTER
[Dear  ___] : Dear  [unfilled], [Consult Letter:] : I had the pleasure of evaluating your patient, [unfilled]. [Please see my note below.] : Please see my note below. [Consult Closing:] : Thank you very much for allowing me to participate in the care of this patient.  If you have any questions, please do not hesitate to contact me. [Sincerely,] : Sincerely, [FreeTextEntry2] : Juan Antonio Rizo MD ([place], [place])\par  [FreeTextEntry3] : Memo Dowd MD, FACS\par Chief of Otolaryngology [facility]\par  - Dept. of Otolaryngology\par [employer]\par \par

## 2021-04-01 NOTE — PHYSICAL EXAM
[Midline] : trachea located in midline position [de-identified] : 3+ and cryptic [Normal] : no rashes

## 2021-04-01 NOTE — DATA REVIEWED
[de-identified] : \par EXAM: MR BRAIN\par \par \par PROCEDURE DATE: 03/22/2021\par \par \par \par INTERPRETATION: INDICATIONS: Finding on patient's prior cervical MR 11/8/2019 at the level of the magali. Patient has vertigo\par \par TECHNIQUE: Multiplanar imaging was performed using T1 weighted, T2 weighted and FLAIR sequences. Diffusion weighted and SWI images were obtained.\par \par COMPARISON EXAMINATION: Cervical MR 11/8/2019\par \par FINDINGS:\par Ventricles and sulci: Normal.\par Intra-axial: Mixed signal T1 mixed signal T2 with a rim of hemosiderin on susceptibility weighted imaging at the mid level of the magali similar in appearance to that seen 11/20/2019 consistent with probable cavernous malformation at this level. 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 Mar 22 2021 4:50PM

## 2021-04-01 NOTE — REVIEW OF SYSTEMS
[Ear Pain] : ear pain [Hearing Loss] : hearing loss [Sinus Pain] : sinus pain [Sinus Pressure] : sinus pressure [Throat Pain] : throat pain [Swollen Glands] : swollen glands [Negative] : Heme/Lymph

## 2021-04-01 NOTE — HISTORY OF PRESENT ILLNESS
[Vertigo] : vertigo [Dizziness] : dizziness [Hearing Loss] : hearing loss [de-identified] : Ms. MCINTOSH is a 40 year female who presents with loss of hearing R>L with some discomfort in the right for the last 1-2 years.  She saw Dr. Magana in 2018 for tinnitus in the right ear but reports not having the tinnitus in that ear anymore.  She reports left ear noise a few years back.  She used peroxide to clean her ear with improvement.  She also reports having enlarged tonsils and sometimes there are debris on her tonsils that she would sometimes clean with a q-tip.  She also reports a bad odor from her throat and nose.  No history of having a hearing test.  \par \par She also reports having an MRI of her brain in March 22 for Vertigo.  She reports having vertigo symptoms every day.

## 2021-04-01 NOTE — REASON FOR VISIT
[[employer] ] : provided by [employer]   [Source: ______] : History obtained from [unfilled] [FreeTextEntry1] : [id number] [FreeTextEntry2] : Liana [TWNoteComboBox1] : [place]

## 2021-04-09 ENCOUNTER — APPOINTMENT (OUTPATIENT)
Dept: CARDIOLOGY | Facility: CLINIC | Age: 41
End: 2021-04-09
Payer: COMMERCIAL

## 2021-04-09 PROCEDURE — 99072 ADDL SUPL MATRL&STAF TM PHE: CPT

## 2021-04-09 PROCEDURE — 93015 CV STRESS TEST SUPVJ I&R: CPT

## 2021-04-27 ENCOUNTER — EMERGENCY (EMERGENCY)
Facility: HOSPITAL | Age: 41
LOS: 1 days | Discharge: HOME | End: 2021-04-27
Attending: EMERGENCY MEDICINE | Admitting: EMERGENCY MEDICINE
Payer: COMMERCIAL

## 2021-04-27 VITALS
HEART RATE: 66 BPM | TEMPERATURE: 98 F | SYSTOLIC BLOOD PRESSURE: 132 MMHG | HEIGHT: 64 IN | WEIGHT: 171.96 LBS | RESPIRATION RATE: 16 BRPM | OXYGEN SATURATION: 100 % | DIASTOLIC BLOOD PRESSURE: 82 MMHG

## 2021-04-27 DIAGNOSIS — Z98.891 HISTORY OF UTERINE SCAR FROM PREVIOUS SURGERY: Chronic | ICD-10-CM

## 2021-04-27 PROCEDURE — 90471 IMMUNIZATION ADMIN: CPT

## 2021-04-27 PROCEDURE — 90715 TDAP VACCINE 7 YRS/> IM: CPT

## 2021-04-27 PROCEDURE — 99283 EMERGENCY DEPT VISIT LOW MDM: CPT | Mod: 25

## 2021-04-27 PROCEDURE — 99284 EMERGENCY DEPT VISIT MOD MDM: CPT

## 2021-04-27 RX ORDER — TETANUS TOXOID, REDUCED DIPHTHERIA TOXOID AND ACELLULAR PERTUSSIS VACCINE, ADSORBED 5; 2.5; 8; 8; 2.5 [IU]/.5ML; [IU]/.5ML; UG/.5ML; UG/.5ML; UG/.5ML
0.5 SUSPENSION INTRAMUSCULAR ONCE
Refills: 0 | Status: COMPLETED | OUTPATIENT
Start: 2021-04-27 | End: 2021-04-27

## 2021-04-27 RX ADMIN — TETANUS TOXOID, REDUCED DIPHTHERIA TOXOID AND ACELLULAR PERTUSSIS VACCINE, ADSORBED 0.5 MILLILITER(S): 5; 2.5; 8; 8; 2.5 SUSPENSION INTRAMUSCULAR at 22:31

## 2021-04-27 NOTE — ED ADULT NURSE NOTE - OBJECTIVE STATEMENT
Left shin puncture wound from a nail that was on the floor. Patient states nail was gisselle, last tetanus unknown.

## 2021-04-27 NOTE — ED PROVIDER NOTE - PATIENT PORTAL LINK FT
You can access the FollowMyHealth Patient Portal offered by [facility] by registering at the following website: [email or web address]. By joining [employer]’s FollowMyHealth portal, you will also be able to view your health information using other applications (apps) compatible with our system.

## 2021-04-27 NOTE — ED PROVIDER NOTE - TOBACCO USE
Plan:  Restart lactulose, 5 ml daily  If Yumi is still straining with bowel movements, please call and will try Senna (colonic stimulant)    Continue pantoprazole twice daily for now    Continue nutramigen, please add additional feeding of 185 ml ( up to 6 feedings per day)    Wait before trying a baby food by mouth until next week.  
Never smoker

## 2021-04-27 NOTE — ED PROVIDER NOTE - CLINICAL SUMMARY MEDICAL DECISION MAKING FREE TEXT BOX
pt p/w bleeding from small wound s/p nail injury to left lower leg, minor puncture , resolved bleeding, wound was cleaned and band aid was applied and Tdap    was updated

## 2021-04-27 NOTE — ED PROVIDER NOTE - OBJECTIVE STATEMENT
41 y/o F with no sig PMHX presents to ed c/o bleeding from small punctured wound on left knee from nail injury, pt kneeled on while removing nails from furniture, tdap more than 13 years ago

## 2021-04-27 NOTE — ED PROVIDER NOTE - NSFOLLOWUPINSTRUCTIONS_ED_ALL_ED_FT
keep wound dry and clean  change dressing daily, apply bacitracin  look for signs of infection as explained  like worsening redness,  swelling, pain or purulent discharge  take Tyelnol for pain  Follow up with your doctor in 2 days   Return to ER if any concern or problem

## 2021-04-29 ENCOUNTER — APPOINTMENT (OUTPATIENT)
Dept: DERMATOLOGY | Facility: CLINIC | Age: 41
End: 2021-04-29

## 2021-05-24 ENCOUNTER — APPOINTMENT (OUTPATIENT)
Dept: NEUROSURGERY | Facility: CLINIC | Age: 41
End: 2021-05-24
Payer: COMMERCIAL

## 2021-05-24 DIAGNOSIS — D18.00 HEMANGIOMA UNSPECIFIED SITE: ICD-10-CM

## 2021-05-24 PROCEDURE — 99203 OFFICE O/P NEW LOW 30 MIN: CPT

## 2021-05-24 PROCEDURE — 99072 ADDL SUPL MATRL&STAF TM PHE: CPT

## 2021-05-25 VITALS
HEART RATE: 76 BPM | DIASTOLIC BLOOD PRESSURE: 64 MMHG | SYSTOLIC BLOOD PRESSURE: 100 MMHG | OXYGEN SATURATION: 98 % | RESPIRATION RATE: 16 BRPM

## 2021-05-25 PROBLEM — D18.00 CAVERNOMA: Status: ACTIVE | Noted: 2021-05-25

## 2021-05-25 NOTE — HISTORY OF PRESENT ILLNESS
[> 3 months] : more  than 3 months [FreeTextEntry1] : dizziness [de-identified] : Patient with intermittent episodes of dizziness for the past four years. Episodes occur 3-4 times a week and last less than 1 minute. No LOC, facial numbness, double vision, n/v, vision changes or weakness are noted during these episodes. No reported falls.\par \par Patient lives an active life. Works full time as a . Lives with her  and three sons in a private home in [place]. Drives without difficulty.

## 2021-05-25 NOTE — DATA REVIEWED
[de-identified] : \par \par EXAM: MR BRAIN\par \par \par PROCEDURE DATE: 03/22/2021\par \par \par \par INTERPRETATION: INDICATIONS: Finding on patient's prior cervical MR 11/8/2019 at the level of the magali. Patient has vertigo\par \par TECHNIQUE: Multiplanar imaging was performed using T1 weighted, T2 weighted and FLAIR sequences. Diffusion weighted and SWI images were obtained.\par \par COMPARISON EXAMINATION: Cervical MR 11/8/2019\par \par FINDINGS:\par Ventricles and sulci: Normal.\par Intra-axial: Mixed signal T1 mixed signal T2 with a rim of hemosiderin on susceptibility weighted imaging at the mid level of the magali similar in appearance to that seen 11/20/2019 consistent with probable cavernous malformation at this level. 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 Mar 22 2021 4:50PM

## 2021-05-25 NOTE — REASON FOR VISIT
[New Patient Visit] : a new patient visit [[employer] ] : provided by [employer]   [FreeTextEntry1] : [id number] [TWNoteComboBox1] : [place]

## 2021-05-25 NOTE — PLAN
[FreeTextEntry1] : Discussion:\par - Reviewed MRI results. Cavernoma, benign and congenital. No changes in lesions compared to prior imaging. No cerebral edema noted.\par - Patient is asymptomatic.\par - Neurosurgical intervention is not indicated. Extremely high risk for poor outcome with any intervention (surgery/Gamma Knife).\par - Patient's memory is not affected by these cavernomas. \par - Referral to ENT for vertigo evaluation and treatment.\par - Follow-up with MRI in 1 year with OV.

## 2022-03-23 ENCOUNTER — APPOINTMENT (OUTPATIENT)
Dept: NEUROSURGERY | Facility: CLINIC | Age: 42
End: 2022-03-23

## 2022-03-23 ENCOUNTER — APPOINTMENT (OUTPATIENT)
Dept: MRI IMAGING | Facility: IMAGING CENTER | Age: 42
End: 2022-03-23

## 2022-05-19 NOTE — ED ADULT TRIAGE NOTE - ACCOMPANIED BY
"SUBJECTIVE:  Hank Wei is a 12 m.o. male who is here for a well checkup accompanied by mother.  Chief Complaint   Patient presents with    Well Child     12mos       HPI  Coming today for 12mos well child check up. Moving to [facility] in 2 days.  Current concerns include none.    Ameenas allergies, medications, history, and problem list were updated as appropriate.    Social Screening:  Family living situation/lives with: both parents  Current child-care arrangements: stays home w/ mom    Review of Systems:    Hearing/Vison:  Concerns regarding hearing? no  Concerns regarding vision?    no    Nutrition:  Current diet: wnl 2oz of formula  Difficulties with feeding/eating? no  Vitamins? no  Fluoride supplement? no    Elimination:  Stool problems? no    Sleep:  Daytime sleep problems?  no  Nighttime sleep problems? no    Developmental concerns regarding:  Hearing? no  Vision? no   Motor skills? no  Behavior/Activity? no  Developmental Milestones  Feeds self finger foods? Yes  Plays games like "peek-a-blackburn" or "pat-a-cake"? Yes  Calls parents "mama" or "kishor" or similar name? Yes  Imitates sounds? Yes  Walking? Yes  Follows simple commands? Yes  No flowsheet data found.    OBJECTIVE:  Vital signs  Vitals:    05/19/22 0906   Temp: 98.2 °F (36.8 °C)   TempSrc: Axillary   Weight: 11 kg (24 lb 5.5 oz)   Height: 2' 7.25" (0.794 m)   HC: 47.6 cm (18.75")       Physical Exam  Vitals and nursing note reviewed.   Constitutional:       Appearance: Normal appearance. He is well-developed.   HENT:      Head: Atraumatic.      Right Ear: Tympanic membrane, ear canal and external ear normal.      Left Ear: Tympanic membrane, ear canal and external ear normal.      Nose: Nose normal.      Mouth/Throat:      Mouth: Mucous membranes are moist.      Pharynx: Oropharynx is clear.   Eyes:      Extraocular Movements: Extraocular movements intact.      Conjunctiva/sclera: Conjunctivae normal.      Pupils: Pupils are equal, round, " and reactive to light.   Cardiovascular:      Rate and Rhythm: Normal rate and regular rhythm.      Pulses: Normal pulses.      Heart sounds: Normal heart sounds.   Pulmonary:      Effort: Pulmonary effort is normal.      Breath sounds: Normal breath sounds.   Abdominal:      General: Abdomen is flat. Bowel sounds are normal.      Palpations: Abdomen is soft.   Genitourinary:     Penis: Normal.       Testes: Normal.   Musculoskeletal:         General: Normal range of motion.      Cervical back: Normal range of motion and neck supple.   Lymphadenopathy:      Cervical: No cervical adenopathy.   Skin:     General: Skin is warm.      Findings: No rash.   Neurological:      General: No focal deficit present.      Mental Status: He is alert and oriented for age.            ASSESSMENT/PLAN:  Hank was seen today for well child.    Diagnoses and all orders for this visit:    Encounter for well child check without abnormal findings    Other orders  -     (In Office Administered) DTaP Vaccine (5 Pertussis Antigens) (Pediatric) (IM)  -     (In Office Administered) HiB (PRP-T) Conjugate Vaccine 4 Dose (IM)           Preventive Health Issues Addressed:  1. Anticipatory guidance discussed and a handout covering well-child issues at this age was provided.     2.. Immunizations and screening tests today: per orders.    Follow Up:  Follow up in about 3 months (around 8/19/2022).           Immediate family member

## 2022-08-17 ENCOUNTER — APPOINTMENT (OUTPATIENT)
Dept: NEUROSURGERY | Facility: CLINIC | Age: 42
End: 2022-08-17

## 2022-08-17 DIAGNOSIS — R11.0 NAUSEA: ICD-10-CM

## 2022-08-17 DIAGNOSIS — R42 DIZZINESS AND GIDDINESS: ICD-10-CM

## 2022-08-17 DIAGNOSIS — R26.89 OTHER ABNORMALITIES OF GAIT AND MOBILITY: ICD-10-CM

## 2022-08-17 DIAGNOSIS — Q28.3 OTHER MALFORMATIONS OF CEREBRAL VESSELS: ICD-10-CM

## 2022-08-17 PROCEDURE — 99214 OFFICE O/P EST MOD 30 MIN: CPT | Mod: 95

## 2022-08-17 NOTE — PHYSICAL EXAM
[General Appearance - Alert] : alert [General Appearance - In No Acute Distress] : in no acute distress [General Appearance - Well Nourished] : well nourished [General Appearance - Well Developed] : well developed [General Appearance - Well-Appearing] : healthy appearing [Oriented To Time, Place, And Person] : oriented to person, place, and time [Impaired Insight] : insight and judgment were intact [Affect] : the affect was normal [Memory Recent] : recent memory was not impaired [Person] : oriented to person [Place] : oriented to place [Time] : oriented to time [Short Term Intact] : short term memory intact [Remote Intact] : remote memory intact [Registration Intact] : recent registration memory intact [Concentration Intact] : normal concentrating ability [Fluency] : fluency intact [Comprehension] : comprehension intact [Cranial Nerves Optic (II)] : visual acuity intact bilaterally,  pupils equal round and reactive to light [Cranial Nerves Oculomotor (III)] : extraocular motion intact [Cranial Nerves Trigeminal (V)] : facial sensation intact symmetrically [Cranial Nerves Facial (VII)] : face symmetrical [Cranial Nerves Glossopharyngeal (IX)] : tongue and palate midline [Cranial Nerves Accessory (XI - Cranial And Spinal)] : head turning and shoulder shrug symmetric [Cranial Nerves Hypoglossal (XII)] : there was no tongue deviation with protrusion [Motor Tone] : muscle tone was normal in all four extremities [Motor Strength] : muscle strength was normal in all four extremities [Involuntary Movements] : no involuntary movements were seen [Motor Handedness Right-Handed] : the patient is right hand dominant [Sclera] : the sclera and conjunctiva were normal [PERRL With Normal Accommodation] : pupils were equal in size, round, reactive to light, with normal accommodation [Outer Ear] : the ears and nose were normal in appearance [Examination Of The Oral Cavity] : the lips and gums were normal [] : no respiratory distress [Abnormal Walk] : normal gait [Skin Color & Pigmentation] : normal skin color and pigmentation

## 2022-08-17 NOTE — REVIEW OF SYSTEMS
[Facial Weakness] : no facial weakness [Numbness] : no numbness [Tingling] : no tingling [Vertigo] : vertigo [Tension Headache] : tension-type headaches [Difficulty Walking] : difficulty walking [Eyesight Problems] : eyesight problems [Loss Of Hearing] : hearing loss [Negative] : Psychiatric

## 2022-08-17 NOTE — ASSESSMENT
[FreeTextEntry1] : Discussion:\par - Reviewed patient's new symptoms.\par - MRI +/- contrast\par - Follow-up OV after imaging.\par - Continue with current medications.\par - Recommend ice/heat for headache relief.\par - Will consider adding zofran after imaging if nausea continues.\par - Refer to neurology for medical management of headaches.\par - All questions answered. Contact information provided for any additional question.\par

## 2022-08-17 NOTE — HISTORY OF PRESENT ILLNESS
[Home] : at home, [unfilled] , at the time of the visit. [Medical Office: ([facility])___] : at the medical office located in  [Verbal consent obtained from patient] : the patient, [unfilled] [FreeTextEntry1] : Patient continues to have vertigo three times a week lasting for approximately 1-1.5 hours. These episodes are always associated with headache. The last three months they have been progressively worse with new nausea. They no longer respond to tylenol. They had previously responded to sitting and rest but now she needs to lie flat and close her eyes. It is affecting her daily activity, most notably her ability to work as a .\par She reports no falls but many near misses from balance difficulties during these episodes. \par \par Patient seen by ENT Nile who has referred her to Dr. Brannon.\par \par Asthma has been well controlled.\par \par

## 2022-08-31 ENCOUNTER — NON-APPOINTMENT (OUTPATIENT)
Age: 42
End: 2022-08-31

## 2022-08-31 ENCOUNTER — APPOINTMENT (OUTPATIENT)
Dept: NEUROSURGERY | Facility: CLINIC | Age: 42
End: 2022-08-31

## 2022-08-31 ENCOUNTER — OUTPATIENT (OUTPATIENT)
Dept: OUTPATIENT SERVICES | Facility: HOSPITAL | Age: 42
LOS: 1 days | End: 2022-08-31
Payer: COMMERCIAL

## 2022-08-31 ENCOUNTER — APPOINTMENT (OUTPATIENT)
Dept: MRI IMAGING | Facility: IMAGING CENTER | Age: 42
End: 2022-08-31

## 2022-08-31 DIAGNOSIS — D18.00 HEMANGIOMA UNSPECIFIED SITE: ICD-10-CM

## 2022-08-31 DIAGNOSIS — Z98.891 HISTORY OF UTERINE SCAR FROM PREVIOUS SURGERY: Chronic | ICD-10-CM

## 2022-08-31 PROCEDURE — 70553 MRI BRAIN STEM W/O & W/DYE: CPT | Mod: 26

## 2022-08-31 PROCEDURE — 99212 OFFICE O/P EST SF 10 MIN: CPT

## 2022-08-31 PROCEDURE — 70553 MRI BRAIN STEM W/O & W/DYE: CPT

## 2022-08-31 PROCEDURE — A9585: CPT

## 2022-08-31 NOTE — PHYSICAL EXAM
[General Appearance - Alert] : alert [General Appearance - In No Acute Distress] : in no acute distress [General Appearance - Well Nourished] : well nourished [General Appearance - Well Developed] : well developed [General Appearance - Well-Appearing] : healthy appearing [] : normal voice and communication [Oriented To Time, Place, And Person] : oriented to person, place, and time [Impaired Insight] : insight and judgment were intact [Affect] : the affect was normal [Mood] : the mood was normal [Person] : oriented to person [Place] : oriented to place [Time] : oriented to time [Cranial Nerves Optic (II)] : visual acuity intact bilaterally,  pupils equal round and reactive to light [Cranial Nerves Oculomotor (III)] : extraocular motion intact [Cranial Nerves Trigeminal (V)] : facial sensation intact symmetrically [Cranial Nerves Facial (VII)] : face symmetrical [Cranial Nerves Vestibulocochlear (VIII)] : hearing was intact bilaterally [Cranial Nerves Glossopharyngeal (IX)] : tongue and palate midline [Cranial Nerves Accessory (XI - Cranial And Spinal)] : head turning and shoulder shrug symmetric [Cranial Nerves Hypoglossal (XII)] : there was no tongue deviation with protrusion [Motor Tone] : muscle tone was normal in all four extremities [Motor Strength] : muscle strength was normal in all four extremities [Involuntary Movements] : no involuntary movements were seen [Sensation Tactile Decrease] : light touch was intact [Abnormal Walk] : normal gait [PERRL With Normal Accommodation] : pupils were equal in size, round, reactive to light, with normal accommodation [Extraocular Movements] : extraocular movements were intact

## 2022-08-31 NOTE — HISTORY OF PRESENT ILLNESS
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 Continues to have vertigo, but increasing numbner of episodes (approx 5x/w); however, each episode lasting only 10-15 minutes. Reports sometimes (approx 1x/w) having few seconds of difficulty balancing when walking. Patient overall reports dizziness is improving. When episodes are present, reports that onset is usually when lying down. No associated nausea or vomiting. F/u MRI reviewed w/ patient which appears grossly stable compared to prior MRI.

## 2022-08-31 NOTE — ASSESSMENT
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 Overall dizziness is improving. F/u MRI reviewed w/ patient which appears grossly stable compared to prior MRI.\par \par Plan:\par -Patient should call office in approx 2y to scheduled f/u visit\par -No acute nsgy intervention needed for cavernomas\par -All patient's questions were answered and contact information was provided.

## 2022-09-21 ENCOUNTER — OUTPATIENT (OUTPATIENT)
Dept: OUTPATIENT SERVICES | Facility: HOSPITAL | Age: 42
LOS: 1 days | End: 2022-09-21
Payer: COMMERCIAL

## 2022-09-21 ENCOUNTER — APPOINTMENT (OUTPATIENT)
Dept: RADIOLOGY | Facility: HOSPITAL | Age: 42
End: 2022-09-21

## 2022-09-21 DIAGNOSIS — Z98.891 HISTORY OF UTERINE SCAR FROM PREVIOUS SURGERY: Chronic | ICD-10-CM

## 2022-09-21 DIAGNOSIS — Z00.8 ENCOUNTER FOR OTHER GENERAL EXAMINATION: ICD-10-CM

## 2022-09-21 PROCEDURE — 73562 X-RAY EXAM OF KNEE 3: CPT | Mod: 26,LT

## 2022-09-21 PROCEDURE — 73562 X-RAY EXAM OF KNEE 3: CPT

## 2022-10-09 ENCOUNTER — EMERGENCY (EMERGENCY)
Facility: HOSPITAL | Age: 42
LOS: 1 days | Discharge: HOME | End: 2022-10-09
Attending: EMERGENCY MEDICINE | Admitting: EMERGENCY MEDICINE
Payer: COMMERCIAL

## 2022-10-09 VITALS
HEART RATE: 88 BPM | SYSTOLIC BLOOD PRESSURE: 120 MMHG | OXYGEN SATURATION: 99 % | TEMPERATURE: 99 F | RESPIRATION RATE: 18 BRPM | DIASTOLIC BLOOD PRESSURE: 78 MMHG

## 2022-10-09 VITALS
WEIGHT: 168.43 LBS | DIASTOLIC BLOOD PRESSURE: 71 MMHG | HEIGHT: 64 IN | OXYGEN SATURATION: 96 % | TEMPERATURE: 98 F | SYSTOLIC BLOOD PRESSURE: 112 MMHG | HEART RATE: 82 BPM | RESPIRATION RATE: 16 BRPM

## 2022-10-09 DIAGNOSIS — Z98.891 HISTORY OF UTERINE SCAR FROM PREVIOUS SURGERY: Chronic | ICD-10-CM

## 2022-10-09 LAB
ALBUMIN SERPL ELPH-MCNC: 3.3 G/DL — SIGNIFICANT CHANGE UP (ref 3.3–5)
ALP SERPL-CCNC: 56 U/L — SIGNIFICANT CHANGE UP (ref 40–120)
ALT FLD-CCNC: 22 U/L — SIGNIFICANT CHANGE UP (ref 10–45)
ANION GAP SERPL CALC-SCNC: 6 MMOL/L — SIGNIFICANT CHANGE UP (ref 5–17)
APPEARANCE UR: CLEAR — SIGNIFICANT CHANGE UP
AST SERPL-CCNC: 27 U/L — SIGNIFICANT CHANGE UP (ref 10–40)
BASOPHILS # BLD AUTO: 0.06 K/UL — SIGNIFICANT CHANGE UP (ref 0–0.2)
BASOPHILS NFR BLD AUTO: 0.8 % — SIGNIFICANT CHANGE UP (ref 0–2)
BILIRUB SERPL-MCNC: 0.2 MG/DL — SIGNIFICANT CHANGE UP (ref 0.2–1.2)
BILIRUB UR-MCNC: NEGATIVE — SIGNIFICANT CHANGE UP
BUN SERPL-MCNC: 7 MG/DL — SIGNIFICANT CHANGE UP (ref 7–23)
CALCIUM SERPL-MCNC: 8.8 MG/DL — SIGNIFICANT CHANGE UP (ref 8.4–10.5)
CHLORIDE SERPL-SCNC: 106 MMOL/L — SIGNIFICANT CHANGE UP (ref 96–108)
CO2 SERPL-SCNC: 26 MMOL/L — SIGNIFICANT CHANGE UP (ref 22–31)
COLOR SPEC: YELLOW — SIGNIFICANT CHANGE UP
CREAT SERPL-MCNC: 0.64 MG/DL — SIGNIFICANT CHANGE UP (ref 0.5–1.3)
DIFF PNL FLD: ABNORMAL
EGFR: 112 ML/MIN/1.73M2 — SIGNIFICANT CHANGE UP
EOSINOPHIL # BLD AUTO: 0.27 K/UL — SIGNIFICANT CHANGE UP (ref 0–0.5)
EOSINOPHIL NFR BLD AUTO: 3.7 % — SIGNIFICANT CHANGE UP (ref 0–6)
GLUCOSE SERPL-MCNC: 91 MG/DL — SIGNIFICANT CHANGE UP (ref 70–99)
GLUCOSE UR QL: NEGATIVE — SIGNIFICANT CHANGE UP
HCT VFR BLD CALC: 36.3 % — SIGNIFICANT CHANGE UP (ref 34.5–45)
HGB BLD-MCNC: 12.1 G/DL — SIGNIFICANT CHANGE UP (ref 11.5–15.5)
IMM GRANULOCYTES NFR BLD AUTO: 0.1 % — SIGNIFICANT CHANGE UP (ref 0–0.9)
KETONES UR-MCNC: NEGATIVE — SIGNIFICANT CHANGE UP
LEUKOCYTE ESTERASE UR-ACNC: NEGATIVE — SIGNIFICANT CHANGE UP
LIDOCAIN IGE QN: 109 U/L — SIGNIFICANT CHANGE UP (ref 73–393)
LYMPHOCYTES # BLD AUTO: 3.28 K/UL — SIGNIFICANT CHANGE UP (ref 1–3.3)
LYMPHOCYTES # BLD AUTO: 44.8 % — HIGH (ref 13–44)
MCHC RBC-ENTMCNC: 28.5 PG — SIGNIFICANT CHANGE UP (ref 27–34)
MCHC RBC-ENTMCNC: 33.3 GM/DL — SIGNIFICANT CHANGE UP (ref 32–36)
MCV RBC AUTO: 85.6 FL — SIGNIFICANT CHANGE UP (ref 80–100)
MONOCYTES # BLD AUTO: 0.67 K/UL — SIGNIFICANT CHANGE UP (ref 0–0.9)
MONOCYTES NFR BLD AUTO: 9.2 % — SIGNIFICANT CHANGE UP (ref 2–14)
NEUTROPHILS # BLD AUTO: 3.03 K/UL — SIGNIFICANT CHANGE UP (ref 1.8–7.4)
NEUTROPHILS NFR BLD AUTO: 41.4 % — LOW (ref 43–77)
NITRITE UR-MCNC: NEGATIVE — SIGNIFICANT CHANGE UP
NRBC # BLD: 0 /100 WBCS — SIGNIFICANT CHANGE UP (ref 0–0)
PH UR: 6 — SIGNIFICANT CHANGE UP (ref 5–8)
PLATELET # BLD AUTO: 343 K/UL — SIGNIFICANT CHANGE UP (ref 150–400)
POTASSIUM SERPL-MCNC: 4.7 MMOL/L — SIGNIFICANT CHANGE UP (ref 3.5–5.3)
POTASSIUM SERPL-SCNC: 4.7 MMOL/L — SIGNIFICANT CHANGE UP (ref 3.5–5.3)
PROT SERPL-MCNC: 7.4 G/DL — SIGNIFICANT CHANGE UP (ref 6–8.3)
PROT UR-MCNC: NEGATIVE — SIGNIFICANT CHANGE UP
RBC # BLD: 4.24 M/UL — SIGNIFICANT CHANGE UP (ref 3.8–5.2)
RBC # FLD: 13.8 % — SIGNIFICANT CHANGE UP (ref 10.3–14.5)
SODIUM SERPL-SCNC: 138 MMOL/L — SIGNIFICANT CHANGE UP (ref 135–145)
SP GR SPEC: 1.01 — SIGNIFICANT CHANGE UP (ref 1.01–1.02)
UROBILINOGEN FLD QL: NEGATIVE — SIGNIFICANT CHANGE UP
WBC # BLD: 7.32 K/UL — SIGNIFICANT CHANGE UP (ref 3.8–10.5)
WBC # FLD AUTO: 7.32 K/UL — SIGNIFICANT CHANGE UP (ref 3.8–10.5)

## 2022-10-09 PROCEDURE — 74177 CT ABD & PELVIS W/CONTRAST: CPT | Mod: MA

## 2022-10-09 PROCEDURE — 83690 ASSAY OF LIPASE: CPT

## 2022-10-09 PROCEDURE — 99285 EMERGENCY DEPT VISIT HI MDM: CPT

## 2022-10-09 PROCEDURE — 96374 THER/PROPH/DIAG INJ IV PUSH: CPT | Mod: XU

## 2022-10-09 PROCEDURE — 80053 COMPREHEN METABOLIC PANEL: CPT

## 2022-10-09 PROCEDURE — 81001 URINALYSIS AUTO W/SCOPE: CPT

## 2022-10-09 PROCEDURE — 74177 CT ABD & PELVIS W/CONTRAST: CPT | Mod: 26,MA

## 2022-10-09 PROCEDURE — 85025 COMPLETE CBC W/AUTO DIFF WBC: CPT

## 2022-10-09 PROCEDURE — 99284 EMERGENCY DEPT VISIT MOD MDM: CPT | Mod: 25

## 2022-10-09 PROCEDURE — 36415 COLL VENOUS BLD VENIPUNCTURE: CPT

## 2022-10-09 PROCEDURE — 87086 URINE CULTURE/COLONY COUNT: CPT

## 2022-10-09 RX ORDER — SODIUM CHLORIDE 9 MG/ML
1000 INJECTION INTRAMUSCULAR; INTRAVENOUS; SUBCUTANEOUS ONCE
Refills: 0 | Status: COMPLETED | OUTPATIENT
Start: 2022-10-09 | End: 2022-10-09

## 2022-10-09 RX ORDER — KETOROLAC TROMETHAMINE 30 MG/ML
30 SYRINGE (ML) INJECTION ONCE
Refills: 0 | Status: DISCONTINUED | OUTPATIENT
Start: 2022-10-09 | End: 2022-10-09

## 2022-10-09 RX ORDER — IBUPROFEN 200 MG
1 TABLET ORAL
Qty: 30 | Refills: 0
Start: 2022-10-09

## 2022-10-09 RX ADMIN — Medication 30 MILLIGRAM(S): at 20:25

## 2022-10-09 RX ADMIN — Medication 30 MILLIGRAM(S): at 21:00

## 2022-10-09 RX ADMIN — SODIUM CHLORIDE 1000 MILLILITER(S): 9 INJECTION INTRAMUSCULAR; INTRAVENOUS; SUBCUTANEOUS at 20:25

## 2022-10-09 RX ADMIN — SODIUM CHLORIDE 1000 MILLILITER(S): 9 INJECTION INTRAMUSCULAR; INTRAVENOUS; SUBCUTANEOUS at 21:00

## 2022-10-09 NOTE — ED ADULT NURSE NOTE - NSICDXPASTMEDICALHX_GEN_ALL_CORE_FT
PAST MEDICAL HISTORY:  Asthma 2014    Other tear of medial meniscus, current injury, right knee, initial encounter

## 2022-10-09 NOTE — ED PROVIDER NOTE - NSFOLLOWUPINSTRUCTIONS_ED_ALL_ED_FT
- take ibuprofen as directed for pain    Follow Up in 1-3 Days with your own doctor or with  [address] [address]  Phone: [phone]        Abdominal Pain    WHAT YOU NEED TO KNOW:    Abdominal pain can be dull, achy, or sharp. You may have pain in one area of your abdomen, or in your entire abdomen. Your pain may be caused by a condition such as constipation, food sensitivity or poisoning, infection, or a blockage. Abdominal pain can also be from a hernia, appendicitis, or an ulcer. Liver, gallbladder, or kidney conditions can also cause abdominal pain. The cause of your abdominal pain may be unknown.     DISCHARGE INSTRUCTIONS:    Return to the emergency department if:   •You have new chest pain or shortness of breath.      •You have pulsing pain in your upper abdomen or lower back that suddenly becomes constant.      •Your pain is in the right lower abdominal area and worsens with movement.       •You have a fever over 100.4°F (38°C) or shaking chills.       •You are vomiting and cannot keep food or liquids down.       •Your pain does not improve or gets worse over the next 8 to 12 hours.       •You see blood in your vomit or bowel movements, or they look black and tarry.       •Your skin or the whites of your eyes turn yellow.       •You are a woman and have a large amount of vaginal bleeding that is not your monthly period.       Contact your healthcare provider if:   •You have pain in your lower back.       •You are a man and have pain in your testicles.      •You have pain when you urinate.       •You have questions or concerns about your condition or care.      Follow up with your healthcare provider within 24 hours or as directed: Write down your questions so you remember to ask them during your visits.     Medicines:   •Medicines may be given to calm your stomach and prevent vomiting or to decrease pain. Ask how to take pain medicine safely.      •Take your medicine as directed. Contact your healthcare provider if you think your medicine is not helping or if you have side effects. Tell him of her if you are allergic to any medicine. Keep a list of the medicines, vitamins, and herbs you take. Include the amounts, and when and why you take them. Bring the list or the pill bottles to follow-up visits. Carry your medicine list with you in case of an emergency

## 2022-10-09 NOTE — ED PROVIDER NOTE - CLINICAL SUMMARY MEDICAL DECISION MAKING FREE TEXT BOX
pt c RLQ /R back pain for 8 days, worse c mvmt. mild RLQ ttp.  will check labs, ua, ct.  r/o appendicitis, renal stone. ?muscular strain.  toradol for pain. reasesss      labs, ct ok.  pain better p meds. f/u pmd

## 2022-10-09 NOTE — ED PROVIDER NOTE - PATIENT PORTAL LINK FT
You can access the FollowMyHealth Patient Portal offered by [facility] by registering at the following website: [email or web address]. By joining [employer]’s FollowMyHealth portal, you will also be able to view your health information using other applications (apps) compatible with our system.

## 2022-10-09 NOTE — ED ADULT NURSE NOTE - NSICDXFAMILYHX_GEN_ALL_CORE_FT
FAMILY HISTORY:  Father  Still living? Yes, Estimated age: 71-80  Hypertension, Age at diagnosis: Age Unknown    Mother  Still living? Yes, Estimated age: 61-70  Family history of stroke, Age at diagnosis: Age Unknown  Hypertension, Age at diagnosis: Age Unknown

## 2022-10-09 NOTE — ED PROVIDER NOTE - OBJECTIVE STATEMENT
pt c/o RLQ pain, sharp, moderate, for last 8 days, with some pain to R low back. pain worse with movement/walking. better when still. no hematuria/dysuria. no n/v/d. no fever. no recalled injury

## 2022-10-09 NOTE — ED ADULT NURSE NOTE - NSFALLRSKHARMRISK_ED_ALL_ED
Chief Complaints and History of Present Illnesses   Patient presents with     Post Op (Ophthalmology) Left Eye     Left Upper Lid Moh's Reconstruction     HPI    Affected eye(s):  Left   Symptoms:     Dryness   Itching      Frequency:  Constant       Do you have eye pain now?:  Yes   Location:  OS   Pain Level:  Mild Pain (3)   Pain Frequency:  Constant      Comments:  S/p Left Upper Lid Moh's Reconstruction 7/25/17. Eyelid feels heavy and feeling like she has been punched in the eyes. Finished with ointment. Left eye feels really dry and itchy.    Norma Talbot COT 10:35 AM August 8, 2017                 no

## 2022-10-09 NOTE — ED ADULT NURSE NOTE - CHIEF COMPLAINT QUOTE
I have pain in my groin and it goes ay back, intermittently for 1 week. Yes - the patient is able to be screened

## 2022-10-11 LAB
CULTURE RESULTS: SIGNIFICANT CHANGE UP
SPECIMEN SOURCE: SIGNIFICANT CHANGE UP

## 2023-01-01 NOTE — ED ADULT NURSE NOTE - CCCP TRG CHIEF CMPLNT
headache
Global muscle tone and symmetry normal/Joint contractures absent/Periods of alertness noted/Grossly responds to touch light and sound stimuli/Gag reflex present/Normal suck-swallow patterns for age/Cry with normal variation of amplitude and frequency/Tongue motility size and shape normal/Tongue - no atrophy or fasciculations/Pauline and grasp reflexes acceptable

## 2023-01-24 ENCOUNTER — OUTPATIENT (OUTPATIENT)
Dept: OUTPATIENT SERVICES | Facility: HOSPITAL | Age: 43
LOS: 1 days | End: 2023-01-24
Payer: COMMERCIAL

## 2023-01-24 ENCOUNTER — APPOINTMENT (OUTPATIENT)
Dept: RADIOLOGY | Facility: HOSPITAL | Age: 43
End: 2023-01-24
Payer: COMMERCIAL

## 2023-01-24 DIAGNOSIS — Z98.891 HISTORY OF UTERINE SCAR FROM PREVIOUS SURGERY: Chronic | ICD-10-CM

## 2023-01-24 DIAGNOSIS — Z00.8 ENCOUNTER FOR OTHER GENERAL EXAMINATION: ICD-10-CM

## 2023-01-24 PROCEDURE — 73030 X-RAY EXAM OF SHOULDER: CPT

## 2023-01-24 PROCEDURE — 73030 X-RAY EXAM OF SHOULDER: CPT | Mod: 26,RT

## 2023-02-14 NOTE — H&P PST ADULT - PRIMARY CARE PROVIDER
Dr. Kirt Rizo [phone] Bed in lowest position, wheels locked, appropriate side rails in place/Call bell, personal items and telephone in reach/Instruct patient to call for assistance before getting out of bed or chair/Non-slip footwear when patient is out of bed/[place] to call system/Physically safe environment - no spills, clutter or unnecessary equipment/Purposeful Proactive Rounding/Room/bathroom lighting operational, light cord in reach

## 2023-04-06 ENCOUNTER — APPOINTMENT (OUTPATIENT)
Dept: OBGYN | Facility: CLINIC | Age: 43
End: 2023-04-06
Payer: SELF-PAY

## 2023-04-06 VITALS
HEART RATE: 81 BPM | WEIGHT: 167 LBS | OXYGEN SATURATION: 98 % | DIASTOLIC BLOOD PRESSURE: 72 MMHG | BODY MASS INDEX: 31.53 KG/M2 | SYSTOLIC BLOOD PRESSURE: 112 MMHG | HEIGHT: 61 IN | TEMPERATURE: 98 F

## 2023-04-06 DIAGNOSIS — D25.9 LEIOMYOMA OF UTERUS, UNSPECIFIED: ICD-10-CM

## 2023-04-06 DIAGNOSIS — N94.9 UNSPECIFIED CONDITION ASSOCIATED WITH FEMALE GENITAL ORGANS AND MENSTRUAL CYCLE: ICD-10-CM

## 2023-04-06 DIAGNOSIS — N76.0 ACUTE VAGINITIS: ICD-10-CM

## 2023-04-06 DIAGNOSIS — R35.0 FREQUENCY OF MICTURITION: ICD-10-CM

## 2023-04-06 DIAGNOSIS — B37.49 OTHER UROGENITAL CANDIDIASIS: ICD-10-CM

## 2023-04-06 DIAGNOSIS — Z01.419 ENCOUNTER FOR GYNECOLOGICAL EXAMINATION (GENERAL) (ROUTINE) W/OUT ABNORMAL FINDINGS: ICD-10-CM

## 2023-04-06 DIAGNOSIS — Z12.39 ENCOUNTER FOR OTHER SCREENING FOR MALIGNANT NEOPLASM OF BREAST: ICD-10-CM

## 2023-04-06 DIAGNOSIS — B96.89 ACUTE VAGINITIS: ICD-10-CM

## 2023-04-06 DIAGNOSIS — N83.209 UNSPECIFIED OVARIAN CYST, UNSPECIFIED SIDE: ICD-10-CM

## 2023-04-06 DIAGNOSIS — Z12.4 ENCOUNTER FOR SCREENING FOR MALIGNANT NEOPLASM OF CERVIX: ICD-10-CM

## 2023-04-06 LAB
BILIRUB UR QL STRIP: NEGATIVE
CLARITY UR: CLEAR
COLLECTION METHOD: NORMAL
GLUCOSE UR-MCNC: NEGATIVE
HCG UR QL: 0.2 EU/DL
HCG UR QL: NEGATIVE
HGB UR QL STRIP.AUTO: NORMAL
KETONES UR-MCNC: NEGATIVE
LEUKOCYTE ESTERASE UR QL STRIP: NEGATIVE
NITRITE UR QL STRIP: NEGATIVE
PH UR STRIP: 5.5
PROT UR STRIP-MCNC: NEGATIVE
QUALITY CONTROL: YES
SP GR UR STRIP: <=1.005

## 2023-04-06 PROCEDURE — 99386 PREV VISIT NEW AGE 40-64: CPT

## 2023-04-07 NOTE — COUNSELING
[Nutrition/ Exercise/ Weight Management] : nutrition, exercise, weight management [Vitamins/Supplements] : vitamins/supplements [Breast Self Exam] : breast self exam [Bladder Hygiene] : bladder hygiene [FreeTextEntry2] : [facility]

## 2023-04-07 NOTE — PHYSICAL EXAM
[Chaperone Declined] : Patient declined chaperone [Appropriately responsive] : appropriately responsive [Alert] : alert [No Acute Distress] : no acute distress [No Lymphadenopathy] : no lymphadenopathy [Soft] : soft [Non-tender] : non-tender [Non-distended] : non-distended [Oriented x3] : oriented x3 [FreeTextEntry7] : midline scar [Examination Of The Breasts] : a normal appearance [No Discharge] : no discharge [No Masses] : no breast masses were palpable [Labia Majora] : normal [Normal] : normal [Uterine Adnexae] : normal

## 2023-04-07 NOTE — HISTORY OF PRESENT ILLNESS
[Patient reported PAP Smear was normal] : Patient reported PAP Smear was normal [Normal Amount/Duration] :  normal amount and duration [Regular Cycle Intervals] : periods have been regular [Menarche Age: ____] : age at menarche was [unfilled] [No] : Patient does not have concerns regarding sex [Currently Active] : currently active [Mammogramdate] : no [BreastSonogramDate] : no [PapSmeardate] : 2018 [FreeTextEntry1] : 03/21/23

## 2023-04-10 PROBLEM — N76.0 BACTERIAL VAGINOSIS: Status: RESOLVED | Noted: 2023-04-10 | Resolved: 2023-05-10

## 2023-04-10 PROBLEM — B37.49 GENITOURINARY INFECTION, CANDIDAL: Status: ACTIVE | Noted: 2023-04-10

## 2023-04-10 LAB
APPEARANCE: CLEAR
BACTERIA UR CULT: NORMAL
BACTERIA: NEGATIVE /HPF
BILIRUBIN URINE: NEGATIVE
BLOOD URINE: NEGATIVE
CANDIDA VAG CYTO: DETECTED
COLOR: NORMAL
G VAGINALIS+PREV SP MTYP VAG QL MICRO: DETECTED
GLUCOSE QUALITATIVE U: NEGATIVE
HPV HIGH+LOW RISK DNA PNL CVX: NOT DETECTED
HYALINE CASTS: 0
KETONES URINE: NEGATIVE
LEUKOCYTE ESTERASE URINE: NEGATIVE
MICROSCOPIC-UA: NORMAL
NITRITE URINE: NEGATIVE
PH URINE: 6
PROTEIN URINE: NEGATIVE
RED BLOOD CELLS URINE: 0 /HPF
SPECIFIC GRAVITY URINE: 1.01
SQUAMOUS EPITHELIAL CELLS: 2
T VAGINALIS VAG QL WET PREP: NOT DETECTED
UROBILINOGEN URINE: NORMAL
WHITE BLOOD CELLS URINE: 0 /HPF

## 2023-04-10 RX ORDER — METRONIDAZOLE 500 MG/1
500 TABLET ORAL TWICE DAILY
Qty: 14 | Refills: 1 | Status: ACTIVE | COMMUNITY
Start: 2023-04-10 | End: 1900-01-01

## 2023-04-10 RX ORDER — TERCONAZOLE 8 MG/G
0.8 CREAM VAGINAL
Qty: 1 | Refills: 1 | Status: ACTIVE | COMMUNITY
Start: 2023-04-10 | End: 1900-01-01

## 2023-04-11 LAB — CYTOLOGY CVX/VAG DOC THIN PREP: ABNORMAL

## 2023-04-12 NOTE — ED PROVIDER NOTE - PSH
S/P  section  , , 2007 Minocycline Counseling: Patient advised regarding possible photosensitivity and discoloration of the teeth, skin, lips, tongue and gums.  Patient instructed to avoid sunlight, if possible.  When exposed to sunlight, patients should wear protective clothing, sunglasses, and sunscreen.  The patient was instructed to call the office immediately if the following severe adverse effects occur:  hearing changes, easy bruising/bleeding, severe headache, or vision changes.  The patient verbalized understanding of the proper use and possible adverse effects of minocycline.  All of the patient's questions and concerns were addressed.

## 2023-09-11 NOTE — ED PROVIDER NOTE - CPE EDP ENMT NORM
Which medication is being requested?: Adderall    Which provider ordered the medication?: Dr. Rankin    Call Center Account # for ordering provider:     Has patient contacted the pharmacy?  No    Is the patient completely out of Medication?  Yes  If patient is out of medication, verify if they are currently experiencing symptoms.  If patient is symptomatic, proceed with front end triage for the patient instead of medication refill.      (CALL HANDLING:  Advise Caller that this call does not guarantee an immediate refill and they should allow up to 72 hours for processing.  Refill request will be reviewed by a qualified clinician within 72 hours.  Please call back with any questions, concerns, or new symptoms.    Patient’s preferred pharmacy has been noted and populated.       [facility]/pharmacy #[id number] - [place], [place] - [address] [facility] [address] [facility]  [address] [place] [address]  Phone: [phone] Fax: [phone]     normal... Azelaic Acid Counseling: Patient counseled that medicine may cause skin irritation and to avoid applying near the eyes.  In the event of skin irritation, the patient was advised to reduce the amount of the drug applied or use it less frequently.   The patient verbalized understanding of the proper use and possible adverse effects of azelaic acid.  All of the patient's questions and concerns were addressed.

## 2024-04-17 ENCOUNTER — EMERGENCY (EMERGENCY)
Facility: HOSPITAL | Age: 44
LOS: 1 days | Discharge: HOME | End: 2024-04-17
Attending: INTERNAL MEDICINE | Admitting: INTERNAL MEDICINE
Payer: COMMERCIAL

## 2024-04-17 VITALS
HEART RATE: 100 BPM | SYSTOLIC BLOOD PRESSURE: 126 MMHG | DIASTOLIC BLOOD PRESSURE: 75 MMHG | RESPIRATION RATE: 17 BRPM | OXYGEN SATURATION: 98 %

## 2024-04-17 VITALS
DIASTOLIC BLOOD PRESSURE: 73 MMHG | HEART RATE: 81 BPM | HEIGHT: 62 IN | SYSTOLIC BLOOD PRESSURE: 112 MMHG | OXYGEN SATURATION: 99 % | TEMPERATURE: 98 F | WEIGHT: 164.91 LBS | RESPIRATION RATE: 18 BRPM

## 2024-04-17 DIAGNOSIS — Z98.891 HISTORY OF UTERINE SCAR FROM PREVIOUS SURGERY: Chronic | ICD-10-CM

## 2024-04-17 LAB
ALBUMIN SERPL ELPH-MCNC: 3.2 G/DL — LOW (ref 3.3–5)
ALP SERPL-CCNC: 80 U/L — SIGNIFICANT CHANGE UP (ref 40–120)
ALT FLD-CCNC: 21 U/L — SIGNIFICANT CHANGE UP (ref 10–45)
ANION GAP SERPL CALC-SCNC: 6 MMOL/L — SIGNIFICANT CHANGE UP (ref 5–17)
AST SERPL-CCNC: 18 U/L — SIGNIFICANT CHANGE UP (ref 10–40)
BASOPHILS # BLD AUTO: 0.04 K/UL — SIGNIFICANT CHANGE UP (ref 0–0.2)
BASOPHILS NFR BLD AUTO: 0.5 % — SIGNIFICANT CHANGE UP (ref 0–2)
BILIRUB SERPL-MCNC: 0.3 MG/DL — SIGNIFICANT CHANGE UP (ref 0.2–1.2)
BUN SERPL-MCNC: 8 MG/DL — SIGNIFICANT CHANGE UP (ref 7–23)
CALCIUM SERPL-MCNC: 8.8 MG/DL — SIGNIFICANT CHANGE UP (ref 8.4–10.5)
CHLORIDE SERPL-SCNC: 106 MMOL/L — SIGNIFICANT CHANGE UP (ref 96–108)
CO2 SERPL-SCNC: 29 MMOL/L — SIGNIFICANT CHANGE UP (ref 22–31)
CREAT SERPL-MCNC: 0.65 MG/DL — SIGNIFICANT CHANGE UP (ref 0.5–1.3)
EGFR: 111 ML/MIN/1.73M2 — SIGNIFICANT CHANGE UP
EOSINOPHIL # BLD AUTO: 0.28 K/UL — SIGNIFICANT CHANGE UP (ref 0–0.5)
EOSINOPHIL NFR BLD AUTO: 3.7 % — SIGNIFICANT CHANGE UP (ref 0–6)
GLUCOSE SERPL-MCNC: 85 MG/DL — SIGNIFICANT CHANGE UP (ref 70–99)
HCT VFR BLD CALC: 35.8 % — SIGNIFICANT CHANGE UP (ref 34.5–45)
HGB BLD-MCNC: 11.7 G/DL — SIGNIFICANT CHANGE UP (ref 11.5–15.5)
IMM GRANULOCYTES NFR BLD AUTO: 0.3 % — SIGNIFICANT CHANGE UP (ref 0–0.9)
LYMPHOCYTES # BLD AUTO: 1.54 K/UL — SIGNIFICANT CHANGE UP (ref 1–3.3)
LYMPHOCYTES # BLD AUTO: 20.4 % — SIGNIFICANT CHANGE UP (ref 13–44)
MCHC RBC-ENTMCNC: 27.5 PG — SIGNIFICANT CHANGE UP (ref 27–34)
MCHC RBC-ENTMCNC: 32.7 GM/DL — SIGNIFICANT CHANGE UP (ref 32–36)
MCV RBC AUTO: 84.2 FL — SIGNIFICANT CHANGE UP (ref 80–100)
MONOCYTES # BLD AUTO: 0.54 K/UL — SIGNIFICANT CHANGE UP (ref 0–0.9)
MONOCYTES NFR BLD AUTO: 7.2 % — SIGNIFICANT CHANGE UP (ref 2–14)
NEUTROPHILS # BLD AUTO: 5.13 K/UL — SIGNIFICANT CHANGE UP (ref 1.8–7.4)
NEUTROPHILS NFR BLD AUTO: 67.9 % — SIGNIFICANT CHANGE UP (ref 43–77)
NRBC # BLD: 0 /100 WBCS — SIGNIFICANT CHANGE UP (ref 0–0)
PLATELET # BLD AUTO: 372 K/UL — SIGNIFICANT CHANGE UP (ref 150–400)
POTASSIUM SERPL-MCNC: 3.8 MMOL/L — SIGNIFICANT CHANGE UP (ref 3.5–5.3)
POTASSIUM SERPL-SCNC: 3.8 MMOL/L — SIGNIFICANT CHANGE UP (ref 3.5–5.3)
PROT SERPL-MCNC: 7.4 G/DL — SIGNIFICANT CHANGE UP (ref 6–8.3)
RBC # BLD: 4.25 M/UL — SIGNIFICANT CHANGE UP (ref 3.8–5.2)
RBC # FLD: 14.4 % — SIGNIFICANT CHANGE UP (ref 10.3–14.5)
SODIUM SERPL-SCNC: 141 MMOL/L — SIGNIFICANT CHANGE UP (ref 135–145)
WBC # BLD: 7.55 K/UL — SIGNIFICANT CHANGE UP (ref 3.8–10.5)
WBC # FLD AUTO: 7.55 K/UL — SIGNIFICANT CHANGE UP (ref 3.8–10.5)

## 2024-04-17 PROCEDURE — 99285 EMERGENCY DEPT VISIT HI MDM: CPT | Mod: 25

## 2024-04-17 PROCEDURE — 71045 X-RAY EXAM CHEST 1 VIEW: CPT

## 2024-04-17 PROCEDURE — 85025 COMPLETE CBC W/AUTO DIFF WBC: CPT

## 2024-04-17 PROCEDURE — 71045 X-RAY EXAM CHEST 1 VIEW: CPT | Mod: 26

## 2024-04-17 PROCEDURE — 96365 THER/PROPH/DIAG IV INF INIT: CPT

## 2024-04-17 PROCEDURE — 96375 TX/PRO/DX INJ NEW DRUG ADDON: CPT

## 2024-04-17 PROCEDURE — 94640 AIRWAY INHALATION TREATMENT: CPT

## 2024-04-17 PROCEDURE — 96361 HYDRATE IV INFUSION ADD-ON: CPT

## 2024-04-17 PROCEDURE — 36415 COLL VENOUS BLD VENIPUNCTURE: CPT

## 2024-04-17 PROCEDURE — 80053 COMPREHEN METABOLIC PANEL: CPT

## 2024-04-17 RX ORDER — MAGNESIUM SULFATE 500 MG/ML
1 VIAL (ML) INJECTION ONCE
Refills: 0 | Status: DISCONTINUED | OUTPATIENT
Start: 2024-04-17 | End: 2024-04-17

## 2024-04-17 RX ORDER — ALBUTEROL 90 UG/1
2.5 AEROSOL, METERED ORAL ONCE
Refills: 0 | Status: COMPLETED | OUTPATIENT
Start: 2024-04-17 | End: 2024-04-17

## 2024-04-17 RX ORDER — ALBUTEROL 90 UG/1
2 AEROSOL, METERED ORAL
Qty: 1 | Refills: 0
Start: 2024-04-17 | End: 2024-05-16

## 2024-04-17 RX ORDER — FLUTICASONE PROPIONATE 220 MCG
440 AEROSOL WITH ADAPTER (GRAM) INHALATION
Qty: 1 | Refills: 0
Start: 2024-04-17 | End: 2024-05-16

## 2024-04-17 RX ORDER — BUDESONIDE, MICRONIZED 100 %
0.5 POWDER (GRAM) MISCELLANEOUS ONCE
Refills: 0 | Status: COMPLETED | OUTPATIENT
Start: 2024-04-17 | End: 2024-04-17

## 2024-04-17 RX ORDER — SODIUM CHLORIDE 9 MG/ML
1000 INJECTION INTRAMUSCULAR; INTRAVENOUS; SUBCUTANEOUS ONCE
Refills: 0 | Status: COMPLETED | OUTPATIENT
Start: 2024-04-17 | End: 2024-04-17

## 2024-04-17 RX ORDER — MAGNESIUM SULFATE 500 MG/ML
2 VIAL (ML) INJECTION ONCE
Refills: 0 | Status: DISCONTINUED | OUTPATIENT
Start: 2024-04-17 | End: 2024-04-17

## 2024-04-17 RX ORDER — MAGNESIUM SULFATE 500 MG/ML
2 VIAL (ML) INJECTION ONCE
Refills: 0 | Status: COMPLETED | OUTPATIENT
Start: 2024-04-17 | End: 2024-04-17

## 2024-04-17 RX ORDER — IPRATROPIUM/ALBUTEROL SULFATE 18-103MCG
3 AEROSOL WITH ADAPTER (GRAM) INHALATION
Refills: 0 | Status: COMPLETED | OUTPATIENT
Start: 2024-04-17 | End: 2024-04-17

## 2024-04-17 RX ORDER — DEXAMETHASONE 0.5 MG/5ML
10 ELIXIR ORAL ONCE
Refills: 0 | Status: COMPLETED | OUTPATIENT
Start: 2024-04-17 | End: 2024-04-17

## 2024-04-17 RX ORDER — MONTELUKAST 4 MG/1
1 TABLET, CHEWABLE ORAL
Qty: 30 | Refills: 0
Start: 2024-04-17 | End: 2024-05-16

## 2024-04-17 RX ADMIN — Medication 0.5 MILLIGRAM(S): at 09:43

## 2024-04-17 RX ADMIN — Medication 3 MILLILITER(S): at 07:54

## 2024-04-17 RX ADMIN — SODIUM CHLORIDE 1000 MILLILITER(S): 9 INJECTION INTRAMUSCULAR; INTRAVENOUS; SUBCUTANEOUS at 07:31

## 2024-04-17 RX ADMIN — ALBUTEROL 2.5 MILLIGRAM(S): 90 AEROSOL, METERED ORAL at 09:45

## 2024-04-17 RX ADMIN — Medication 10 MILLIGRAM(S): at 07:42

## 2024-04-17 RX ADMIN — Medication 3 MILLILITER(S): at 07:41

## 2024-04-17 RX ADMIN — SODIUM CHLORIDE 1000 MILLILITER(S): 9 INJECTION INTRAMUSCULAR; INTRAVENOUS; SUBCUTANEOUS at 08:30

## 2024-04-17 RX ADMIN — Medication 102 MILLIGRAM(S): at 07:32

## 2024-04-17 RX ADMIN — Medication 3 MILLILITER(S): at 07:32

## 2024-04-17 RX ADMIN — Medication 150 GRAM(S): at 07:32

## 2024-04-17 RX ADMIN — Medication 2 GRAM(S): at 07:52

## 2024-04-17 NOTE — ED ADULT NURSE NOTE - OBJECTIVE STATEMENT
pt came in from home with c/o wheezing and shortness of breath cough. pt with history of asthma as per the , patient is very allergic to the pollens and states shes been having SOB worsening with pollen increase lately. Patient reports she has been using her albuterol inhaler with no relief. pt with O2 sat 99% on RA.

## 2024-04-17 NOTE — ED ADULT NURSE NOTE - NSFALLUNIVINTERV_ED_ALL_ED
Bed/Stretcher in lowest position, wheels locked, appropriate side rails in place/Call bell, personal items and telephone in reach/Instruct patient to call for assistance before getting out of bed/chair/stretcher/Non-slip footwear applied when patient is off stretcher/[place] to call system/Physically safe environment - no spills, clutter or unnecessary equipment/Purposeful proactive rounding/Room/bathroom lighting operational, light cord in reach

## 2024-04-17 NOTE — ED PROVIDER NOTE - PATIENT PORTAL LINK FT
You can access the FollowMyHealth Patient Portal offered by [facility] by registering at the following website: [email or web address]. By joining "[employer]’s FollowMyHealth portal, you will also be able to view your health information using other applications (apps) compatible with our system.

## 2024-04-17 NOTE — ED PROVIDER NOTE - PHYSICAL EXAMINATION
General:     NAD, well-nourished, well-appearing  Head:     NC/AT, EOMI, oral mucosa moist  Neck:     trachea midline  Lungs:     Bilateral wheezing bilateral decreased air entry  CVS:     S1S2, RRR, no m/g/r  Abd:     +BS, s/nt/nd, no organomegaly  Ext:    2+ radial and pedal pulses, no c/c/e  Neuro: AAOx3, no sensory/motor deficits

## 2024-04-17 NOTE — ED PROVIDER NOTE - CARE PROVIDER_API CALL
Oli Nelson  Pulmonary Disease  [address] [place] [address]  Phone: [phone]  Fax: [phone]  Follow Up Time:

## 2024-04-17 NOTE — ED PROVIDER NOTE - CARE PLAN
URGENT CARE DEPARTMENT ENCOUNTER      CHIEF COMPLAINT    Chief Complaint   Patient presents with   • Animal Bite     Squirrel scratch/nips       HISTORY OF PRESENT ILLNESS    Rojas Sahu is a pleasant, very anxious, 49 year old female who presents to the [facility] alone for evaluation and treatment of squirrel bite.  Pt was feeding the squirrels that gather outside of her house when one\"hungry, aggressive squirrel\"  jumped on hand. Pt was uncertain if was scratched or nibbled at. Pt washed hands after, and used peroxide. Did not see any marks on Friday when incident occurred. Pt states she is very anxious and was looking at hands today and noticed the tiny marks. Now feeling mild tenderness.  Patient expresses side significant anxiety about rabies or infection.    ALLERGIES    ALLERGIES:   Allergen Reactions   • Ciprofloxacin NAUSEA     dizziness       CURRENT MEDICATIONS    Current Outpatient Medications   Medication Sig Dispense Refill   • Progesterone 100 MG Cap Take 1 capsule by mouth daily. 90 capsule 3   • estradiol (ESTRACE) 1 MG tablet Take 1.5 tablets by mouth daily. 45 tablet 11   • buPROPion (WELLBUTRIN) 75 MG tablet Take one tablet daily along with 150 mg (225 mg) 30 tablet 1   • buPROPion XL (WELLBUTRIN XL) 150 MG 24 hr tablet Take 1 tablet by mouth daily. 30 tablet 5   • liothyronine (CYTOMEL) 5 MCG tablet Take 2 tablets by mouth in the morning and 2 tablets in the evening. 360 tablet 0   • levothyroxine 75 MCG tablet Take 1 tablet by mouth daily. 30 tablet 2   • LORazepam (ATIVAN) 2 MG tablet Do not start before March 23, 2023. TAKE 1 TABLET BY MOUTH EVERY NIGHT AS NEEDED FOR ANXIETY OR PANIC 30 tablet 0     No current facility-administered medications for this visit.       PAST MEDICAL HISTORY    Past Medical History:   Diagnosis Date   • Allergy     cipro   • Anxiety    • Depression    • Fracture     left metatarasal left foot 5 th toe per patient   • Genital  warts    • History of 2019 novel coronavirus disease (COVID-19) 01/14/2022   • IBS (irritable bowel syndrome) 08/01/2017    Normal EGD and Colonoscopy   • LGSIL on Pap smear of cervix 2012   • Ovarian cyst    • PID (acute pelvic inflammatory disease) 2016   • Thyroid nodule 02/24/2017    thyroid Bx is pending   • Urinary tract infection    • Vertigo        SURGICAL HISTORY    Past Surgical History:   Procedure Laterality Date   • Appendectomy     • Breast reduction surgery Bilateral    • Colonoscopy diagnostic  08/01/2017    Dr Williamson recall 10 years   • Condyloma excision/fulguration  11/28/2017    perianal   • Esophagogastroduodenoscopy transoral flex w/bx single or mult  08/01/2017    Dr Williamson   • Hysteroscopy,bio endo/polyptmy  11/28/2017   • Thyroid lobectomy,unilat Right 03/13/2017   • Us guided thyroid biopsy  02/24/2017    results pendind ([facility])       SOCIAL HISTORY    Social History     Tobacco Use   • Smoking status: Former     Current packs/day: 0.00     Types: Cigarettes     Quit date: 8/1/2019     Years since quitting: 3.7     Passive exposure: Past   • Smokeless tobacco: Never   Vaping Use   • Vaping status: Former   Substance Use Topics   • Alcohol use: Not Currently   • Drug use: No       FAMILY HISTORY    Family History   Problem Relation Age of Onset   • Aneurysm Mother 60        cerebral, survived   • Thyroid Mother    • Psychiatric Mother    • Osteoporosis Mother    • Stroke Mother 60   • Myocardial Infarction Father         70s    • Psychiatric Father    • Cancer Father         Kidney   • Thyroid Sister    • Lung Disease Paternal Aunt         PE   • Other Other         denies brst, ov, or colon cancer       REVIEW OF SYSTEMS    Constitutional:  Denies unusual weight gain, recent weight change, weakness, fatigue, fever, chills  Eyes:  Denies visual changes, redness, excessive tearing, double vision  HENT:  Denies headache, head injury, hearing loss, tinnitus, vertigo, earaches, infection,  discharge, frequent colds, nasal stuffiness, sinus trouble, sore throat, hoarseness, lymphadenopathy   Respiratory:  Denies cough, sputum, hemoptysis, wheezing, asthma, bronchitis, emphysema, pneumonia, tuberculosis.    Cardiovascular:  Denies heart trouble, hypertension, rheumatic fever, murmur, dyspnea, orthopnea, edema, chest pain, palpitations  GI:  Denies abdominal pain, nausea, vomiting, diarrhea, constipation, blood in bowel movement, hemorrhoids, rectal pain, and dyspepsia.   :  Denies dysuria, polyuria, nocturia, hematuria, urgency, incontinence, infections, stones.    Musculoskeletal:  Denies joint pains, stiffness, arthritis, gout, back pain, neck pain, myalgias.    Integument:  See HPI  Neurologic:  Denies fainting, blackouts, seizures, weakness, numbness, tingling, tremors, memory loss.        PHYSICAL EXAM    Vitals:    05/01/23 1516   BP: 127/80   Pulse: 78   Resp: 18   Temp: 97.6 °F (36.4 °C)   TempSrc: Tympanic   SpO2: 98%   LMP: 06/11/2020         Constitutional:  Well developed, well nourished. No acute distress, non-toxic appearance.   Respiratory:  No respiratory distress. Lungs are clear to auscultation bilaterally. No rales or wheezing appreciated. No stridor.   Integument:  Solitary puncture wound to Jaquez aspect of the 3rd digit approximately 1 mm, small scratch to the lateral aspect of 4th digit left hand approximately 3 mm  Neurologic:  Alert & oriented to conversation. Moves all extremities. Speech is clear.   Psychiatric:  Speech and behavior appropriate. Very nervous     COURSE & MEDICAL DECISION MAKING    49 year old female who presents with questionable squirrel bite  DDX:  Squirrel bite versus random scratch    IMPRESSION  Diagnosis:  The encounter diagnosis was Animal bite.    PLAN  1. Patient reassured  2. Treatment plan to include conservative supportive measures  3. All questions and concerns addressed.  4. Return precautions discussed.  Follow-up with family practice in the  next few days, if sx persist or ED/UC if sx worsen.   5. Patient discharged in stable and improved condition.      Arian Thao PA-C, [facility]  [facility]    The supervising physician for services performed today is   .           Principal Discharge DX:	Acute asthma exacerbation   1

## 2024-04-17 NOTE — ED PROVIDER NOTE - WR ORDER DATE AND TIME 1
Wellbutrin       Last Written Prescription Date:  10/7/22  Last Fill Quantity: 90,   # refills: 3    [employer]       Last Written Prescription Date:  11/9/21  Last Fill Quantity: 84,   # refills: 3  Last Office Visit: 9/26/22  Future Office visit:          17-Apr-2024 07:23

## 2024-04-17 NOTE — ED ADULT TRIAGE NOTE - CHIEF COMPLAINT QUOTE
Pt states "I am struggling to breathe with my asthma.  I took albuterol but it did not help".  PCP Dr Rizo.

## 2024-04-17 NOTE — ED PROVIDER NOTE - CLINICAL SUMMARY MEDICAL DECISION MAKING FREE TEXT BOX
43-year-old female came to the emergency room chief complaint of wheezing shortness of breath cough history of asthma as per the  patient is very allergic to the pollens Patient has been using her albuterol inhaler with no relief  9:29 AM patient feels much better good air entry bilateral scattered wheezing bilaterally plan to give a steroid nebulizer and albuterol and will reevaluate likely the patient is going to be discharged CBC CMP chest x-ray within normal limits plan to discharge the patient on albuterol Flovent Singulair And prednisone

## 2024-05-30 ENCOUNTER — OUTPATIENT (OUTPATIENT)
Dept: OUTPATIENT SERVICES | Facility: HOSPITAL | Age: 44
LOS: 1 days | End: 2024-05-30
Payer: COMMERCIAL

## 2024-05-30 ENCOUNTER — APPOINTMENT (OUTPATIENT)
Dept: ULTRASOUND IMAGING | Facility: HOSPITAL | Age: 44
End: 2024-05-30
Payer: COMMERCIAL

## 2024-05-30 DIAGNOSIS — Z98.891 HISTORY OF UTERINE SCAR FROM PREVIOUS SURGERY: Chronic | ICD-10-CM

## 2024-05-30 DIAGNOSIS — Z00.8 ENCOUNTER FOR OTHER GENERAL EXAMINATION: ICD-10-CM

## 2024-05-30 PROCEDURE — 76700 US EXAM ABDOM COMPLETE: CPT

## 2024-05-30 PROCEDURE — 76700 US EXAM ABDOM COMPLETE: CPT | Mod: 26

## 2024-06-05 ENCOUNTER — APPOINTMENT (OUTPATIENT)
Dept: MAMMOGRAPHY | Facility: HOSPITAL | Age: 44
End: 2024-06-05
Payer: COMMERCIAL

## 2024-06-05 ENCOUNTER — APPOINTMENT (OUTPATIENT)
Dept: ULTRASOUND IMAGING | Facility: HOSPITAL | Age: 44
End: 2024-06-05
Payer: COMMERCIAL

## 2024-06-05 ENCOUNTER — OUTPATIENT (OUTPATIENT)
Dept: OUTPATIENT SERVICES | Facility: HOSPITAL | Age: 44
LOS: 1 days | End: 2024-06-05
Payer: COMMERCIAL

## 2024-06-05 DIAGNOSIS — Z00.8 ENCOUNTER FOR OTHER GENERAL EXAMINATION: ICD-10-CM

## 2024-06-05 DIAGNOSIS — Z98.891 HISTORY OF UTERINE SCAR FROM PREVIOUS SURGERY: Chronic | ICD-10-CM

## 2024-06-05 PROCEDURE — 77067 SCR MAMMO BI INCL CAD: CPT | Mod: 26

## 2024-06-05 PROCEDURE — 77063 BREAST TOMOSYNTHESIS BI: CPT | Mod: 26

## 2024-06-05 PROCEDURE — 76641 ULTRASOUND BREAST COMPLETE: CPT | Mod: 26,50

## 2024-06-05 PROCEDURE — 77067 SCR MAMMO BI INCL CAD: CPT

## 2024-06-05 PROCEDURE — 77063 BREAST TOMOSYNTHESIS BI: CPT

## 2024-06-05 PROCEDURE — 76641 ULTRASOUND BREAST COMPLETE: CPT

## 2024-06-05 NOTE — ED PROVIDER NOTE - RESPIRATORY NEGATIVE STATEMENT, MLM
Quality 137: Melanoma: Continuity Of Care - Recall System: Patient information entered into a recall system that includes: target date for the next exam specified AND a process to follow up with patients regarding missed or unscheduled appointments Quality 226: Preventive Care And Screening: Tobacco Use: Screening And Cessation Intervention: Patient screened for tobacco use and is an ex/non-smoker Quality 431: Preventive Care And Screening: Unhealthy Alcohol Use - Screening: Patient not identified as an unhealthy alcohol user when screened for unhealthy alcohol use using a systematic screening method Detail Level: Detailed no chest pain, no cough, and no shortness of breath.

## 2024-06-18 ENCOUNTER — APPOINTMENT (OUTPATIENT)
Dept: ULTRASOUND IMAGING | Facility: HOSPITAL | Age: 44
End: 2024-06-18
Payer: COMMERCIAL

## 2024-06-18 ENCOUNTER — OUTPATIENT (OUTPATIENT)
Dept: OUTPATIENT SERVICES | Facility: HOSPITAL | Age: 44
LOS: 1 days | End: 2024-06-18
Payer: COMMERCIAL

## 2024-06-18 ENCOUNTER — APPOINTMENT (OUTPATIENT)
Dept: MAMMOGRAPHY | Facility: HOSPITAL | Age: 44
End: 2024-06-18
Payer: COMMERCIAL

## 2024-06-18 DIAGNOSIS — R92.2 INCONCLUSIVE MAMMOGRAM: ICD-10-CM

## 2024-06-18 DIAGNOSIS — Z98.891 HISTORY OF UTERINE SCAR FROM PREVIOUS SURGERY: Chronic | ICD-10-CM

## 2024-06-18 PROCEDURE — 77065 DX MAMMO INCL CAD UNI: CPT | Mod: 26,RT

## 2024-06-18 PROCEDURE — 76641 ULTRASOUND BREAST COMPLETE: CPT

## 2024-06-18 PROCEDURE — G0279: CPT | Mod: 26

## 2024-06-18 PROCEDURE — 76641 ULTRASOUND BREAST COMPLETE: CPT | Mod: 26

## 2024-06-18 PROCEDURE — G0279: CPT

## 2024-06-18 PROCEDURE — 77065 DX MAMMO INCL CAD UNI: CPT

## 2024-09-19 ENCOUNTER — APPOINTMENT (OUTPATIENT)
Dept: FAMILY MEDICINE | Facility: HOSPITAL | Age: 44
End: 2024-09-19

## 2024-09-19 VITALS
DIASTOLIC BLOOD PRESSURE: 62 MMHG | HEART RATE: 85 BPM | WEIGHT: 177 LBS | TEMPERATURE: 97.9 F | OXYGEN SATURATION: 96 % | BODY MASS INDEX: 33.44 KG/M2 | SYSTOLIC BLOOD PRESSURE: 107 MMHG | RESPIRATION RATE: 14 BRPM

## 2024-09-19 DIAGNOSIS — R10.12 LEFT UPPER QUADRANT PAIN: ICD-10-CM

## 2024-09-19 DIAGNOSIS — D50.9 IRON DEFICIENCY ANEMIA, UNSPECIFIED: ICD-10-CM

## 2024-09-19 DIAGNOSIS — K76.9 LIVER DISEASE, UNSPECIFIED: ICD-10-CM

## 2024-09-19 DIAGNOSIS — G89.29 LEFT UPPER QUADRANT PAIN: ICD-10-CM

## 2024-09-19 DIAGNOSIS — A04.8 OTHER SPECIFIED BACTERIAL INTESTINAL INFECTIONS: ICD-10-CM

## 2024-09-19 DIAGNOSIS — R10.13 EPIGASTRIC PAIN: ICD-10-CM

## 2024-09-19 DIAGNOSIS — R20.2 PARESTHESIA OF SKIN: ICD-10-CM

## 2024-09-19 DIAGNOSIS — K59.00 CONSTIPATION, UNSPECIFIED: ICD-10-CM

## 2024-09-19 RX ORDER — PSYLLIUM HUSK (WITH SUGAR) 3 G/7 G
43 POWDER (GRAM) ORAL
Qty: 1 | Refills: 0 | Status: ACTIVE | COMMUNITY
Start: 2024-09-19 | End: 1900-01-01

## 2024-09-19 RX ORDER — RIBOFLAVIN (VITAMIN B2) 100 MG
100 TABLET ORAL
Refills: 0 | Status: ACTIVE | COMMUNITY
Start: 2024-09-19

## 2024-09-21 LAB
ALBUMIN SERPL ELPH-MCNC: 4.2 G/DL
ALP BLD-CCNC: 62 U/L
ALT SERPL-CCNC: 17 U/L
ANION GAP SERPL CALC-SCNC: 8 MMOL/L
AST SERPL-CCNC: 22 U/L
BASOPHILS # BLD AUTO: 0.06 K/UL
BASOPHILS NFR BLD AUTO: 1 %
BILIRUB SERPL-MCNC: <0.2 MG/DL
BUN SERPL-MCNC: 9 MG/DL
CALCIUM SERPL-MCNC: 9.2 MG/DL
CHLORIDE SERPL-SCNC: 106 MMOL/L
CO2 SERPL-SCNC: 24 MMOL/L
CREAT SERPL-MCNC: 0.79 MG/DL
EGFR: 94 ML/MIN/1.73M2
EOSINOPHIL # BLD AUTO: 0.16 K/UL
EOSINOPHIL NFR BLD AUTO: 2.7 %
FOLATE SERPL-MCNC: 18.2 NG/ML
GLUCOSE SERPL-MCNC: 105 MG/DL
HCT VFR BLD CALC: 37.1 %
HGB BLD-MCNC: 11.8 G/DL
IMM GRANULOCYTES NFR BLD AUTO: 0.2 %
LYMPHOCYTES # BLD AUTO: 2.65 K/UL
LYMPHOCYTES NFR BLD AUTO: 44 %
MAN DIFF?: NORMAL
MCHC RBC-ENTMCNC: 28.5 PG
MCHC RBC-ENTMCNC: 31.8 GM/DL
MCV RBC AUTO: 89.6 FL
MONOCYTES # BLD AUTO: 0.48 K/UL
MONOCYTES NFR BLD AUTO: 8 %
NEUTROPHILS # BLD AUTO: 2.66 K/UL
NEUTROPHILS NFR BLD AUTO: 44.1 %
PLATELET # BLD AUTO: 356 K/UL
POTASSIUM SERPL-SCNC: 4.2 MMOL/L
PROT SERPL-MCNC: 7 G/DL
RBC # BLD: 4.14 M/UL
RBC # FLD: 14.7 %
SODIUM SERPL-SCNC: 139 MMOL/L
VIT B12 SERPL-MCNC: 927 PG/ML
WBC # FLD AUTO: 6.02 K/UL

## 2024-09-26 ENCOUNTER — APPOINTMENT (OUTPATIENT)
Dept: GASTROENTEROLOGY | Facility: CLINIC | Age: 44
End: 2024-09-26
Payer: COMMERCIAL

## 2024-09-26 ENCOUNTER — RESULT REVIEW (OUTPATIENT)
Age: 44
End: 2024-09-26

## 2024-09-26 VITALS
OXYGEN SATURATION: 98 % | HEART RATE: 78 BPM | DIASTOLIC BLOOD PRESSURE: 69 MMHG | SYSTOLIC BLOOD PRESSURE: 104 MMHG | WEIGHT: 177 LBS | HEIGHT: 61 IN | BODY MASS INDEX: 33.42 KG/M2 | RESPIRATION RATE: 16 BRPM

## 2024-09-26 DIAGNOSIS — Z78.9 OTHER SPECIFIED HEALTH STATUS: ICD-10-CM

## 2024-09-26 DIAGNOSIS — R09.A2 FOREIGN BODY SENSATION, THROAT: ICD-10-CM

## 2024-09-26 DIAGNOSIS — D50.9 IRON DEFICIENCY ANEMIA, UNSPECIFIED: ICD-10-CM

## 2024-09-26 DIAGNOSIS — R10.13 EPIGASTRIC PAIN: ICD-10-CM

## 2024-09-26 DIAGNOSIS — R13.19 OTHER DYSPHAGIA: ICD-10-CM

## 2024-09-26 DIAGNOSIS — A04.8 OTHER SPECIFIED BACTERIAL INTESTINAL INFECTIONS: ICD-10-CM

## 2024-09-26 PROCEDURE — 99204 OFFICE O/P NEW MOD 45 MIN: CPT

## 2024-09-26 RX ORDER — LORATADINE 5 MG
17 TABLET,CHEWABLE ORAL
Qty: 1 | Refills: 6 | Status: ACTIVE | COMMUNITY
Start: 2024-09-26 | End: 1900-01-01

## 2024-09-26 RX ORDER — POLYETHYLENE GLYCOL 3350 17 G/17G
17 POWDER, FOR SOLUTION ORAL
Qty: 1 | Refills: 0 | Status: ACTIVE | COMMUNITY
Start: 2024-09-26 | End: 1900-01-01

## 2024-09-27 LAB — H PYLORI AG STL QL: NEGATIVE

## 2024-09-30 ENCOUNTER — NON-APPOINTMENT (OUTPATIENT)
Age: 44
End: 2024-09-30

## 2024-10-01 ENCOUNTER — APPOINTMENT (OUTPATIENT)
Dept: CT IMAGING | Facility: HOSPITAL | Age: 44
End: 2024-10-01
Payer: COMMERCIAL

## 2024-10-01 PROCEDURE — 74177 CT ABD & PELVIS W/CONTRAST: CPT | Mod: 26

## 2024-10-01 NOTE — HISTORY OF PRESENT ILLNESS
[FreeTextEntry1] : 42 y/o F from [place] with PMHx of asthma, iron deficiency anemia, hemangioma of the liver, cholelithiasis, persistent H pylori presents to GI clinic for evaluation of abdominal pain, H pylori infection, iron deficiency anemia.   #Abdominal pain: Patient states she is experiencing two different types of abdominal pain. She has been having burning epigastric pain for the past three months which occurs after every meal regardless of what she eats. It starts within a minute of eating, last an hour, and then goes away on its own. She does not take any medications. Patient states that this pain is not a/w nausea, vomiting, changes in bowel habits. Endorses long standing constipation (goes every 3 days, strains w/ BM). She denies weight loss, early satiety. She does endorse dysphagia with liquids but not solids which is long standing as well as frequent acid reflux. She is not on a PPI or other acid blocker. She has never had an EGD or colonoscopy. She also states she experiences stabbing LUQ/subcostal pain which wraps around to her back. It is not related to eating or movement. SHe does not have any other GI sx. The pain has been constantly there over the past 9 months but has been getting worse. She will take prn Tylenol for it.  #H pylori: Patient states she was first diagnosed in 2020 and has now been treated three times. Her last treatment was in June, and she submitted her stool sample for h pylori antigen testing yesterday.  #MARGI: Patient was told years ago that she had MARGI and she is currently taking daily iron supplements. Last Hgb noted to be wnl. She denies heavy menstruation, melena, hematochezia.  #Liver hemangioma: Patient has a liver lesion first seen on CT 2018, stable on repeat CT 2022. Patient currently ordered for non-CT A/P by her PCP for further evaluation, as it was again demonstrated on recent US (5/2024) which was performed for evaluation of abdominal pain. She was also found to have gallstones, but normal CBD.  Patient denies a family hx of CRC or other GI cancers.  1dFemale, born via [ x]   [ ] C/S   Maternal Prenatal labs:  Blood type  _A+___, HepBsAg  negative,  RPR  nonreactive,  HIV  negative, Rubella  immune     GBS status [ x] negative  [ ] unknown  [ ] positive   Treated with antibiotics prior to delivery  [ ] yes *** doses of *** [  x] No  ROM was  4  hours    Infant emerged vigorous and was dried, warmed and stimulated.  Apgars  9  /9  Received vitK and erythromycin in the delivery room.  EOS: 0.08   Birth weight:   3150            g                The nursery course to date has been un-remarkable    Physical Examination:  Height (cm): 48.5 (24 @ 18:48)  Weight (kg): 3.15 (24 @ 18:48)  BMI (kg/m2): 13.4 (24 @ 18:48)  BSA (m2): 0.2 (24 @ 18:48)  Head Circumference (cm): 35 (2024 14:51)    Gen: well appearing , in no acute distress  HEENT: AFOF, normocephalic atraumatic,. PERRL, EOMI +red reflex. MMM, no cleft lip or palate, lesions in mouth/throat. No preauricular pits, tags noted. Nares patent  Neck: supple no crepitus  noted to clavicles  CV: regular rate and rhythm , no murmurs/rubs or gallops, WWP, 2+ femoral pulses palpated bilaterally  Pulm: clear to ausculation bilaterally, breathing comfortably  Abd: soft nondistended, nontender, umbilical cord c/d/i, no organomegaly  : normal female anatomy. Anus visually patent  Neuro: intact reflexes; strong suck reflex, grasp reflex intact +symmetric Barnum  Extremities: negative Woodward and ortolani, full ROM x4, intermittent right hip click  Skin: warm, well perfused, no rashes or lesions noted    Laboratory & Imaging Studies:        CAPILLARY BLOOD GLUCOSE          Assessment:   1.  Well  39.1 week term /Appropriate for gestational age  Admit to well baby nursery  Normal / Healthy  Care and teaching  Bilirubin, CCHD, Hearing Screen,  Screen at 24 hours  [ ] Hypoglycemia Protocol for SGA / LGA / IDM / Premature Infant  [ ] Connie positive: Hyperbilirubinemia protocol  [ ] Breech Delivery: Hip US at 4-6 weeks of life  [ ] Other:   Discussed hep B vaccine, feeding and stooling/voiding patterns, and safe sleep with parents.    Margi Eason MD  Pediatric Hospitalist 1dFemale, born via [ x]   [ ] C/S   Maternal Prenatal labs:  Blood type  _A+___, HepBsAg  negative,  RPR  nonreactive,  HIV  negative, Rubella  immune     GBS status [ x] negative  [ ] unknown  [ ] positive   Treated with antibiotics prior to delivery  [ ] yes *** doses of *** [  x] No  ROM was  4  hours    Infant emerged vigorous and was dried, warmed and stimulated.  Apgars  9  /9  Received vitK and erythromycin in the delivery room.  EOS: 0.08   Birth weight:   3150            g                The nursery course to date has been un-remarkable    Physical Examination:  Height (cm): 48.5 (24 @ 18:48)  Weight (kg): 3.15 (24 @ 18:48)  BMI (kg/m2): 13.4 (24 @ 18:48)  BSA (m2): 0.2 (24 @ 18:48)  Head Circumference (cm): 35 (2024 14:51)    Gen: well appearing , in no acute distress  HEENT: AFOF, normocephalic atraumatic,. PERRL, EOMI +red reflex. MMM, no cleft lip or palate, lesions in mouth/throat. No preauricular pits, tags noted. Nares patent  Neck: supple no crepitus  noted to clavicles  CV: regular rate and rhythm , no murmurs/rubs or gallops, WWP, 2+ femoral pulses palpated bilaterally  Pulm: clear to ausculation bilaterally, breathing comfortably  Abd: soft nondistended, nontender, umbilical cord c/d/i, no organomegaly  : normal female anatomy. Anus visually patent  Neuro: intact reflexes; strong suck reflex, grasp reflex intact +symmetric Arthur  Extremities: negative Woodward and ortolani, full ROM x4, intermittent right hip click  Skin: warm, well perfused, no rashes or lesions noted    Laboratory & Imaging Studies:        CAPILLARY BLOOD GLUCOSE          Assessment:   1.  Well  39.1 week term /Appropriate for gestational age  Admit to well baby nursery  Normal / Healthy  Care and teaching  Bilirubin, CCHD, Hearing Screen,  Screen at 24 hours  [ ] Hypoglycemia Protocol for SGA / LGA / IDM / Premature Infant  [ ] Connie positive: Hyperbilirubinemia protocol  [ ] Breech Delivery: Hip US at 4-6 weeks of life  [ ] Other:   Discussed hep B vaccine, feeding and stooling/voiding patterns, and safe sleep with parents.    Margi Eason MD  Pediatric Hospitalist

## 2024-10-01 NOTE — REASON FOR VISIT
[Initial Evaluation] : an initial evaluation [[employer] ] : provided by [employer]   [FreeTextEntry1] : iron deficiency anemia [Interpreters_IDNumber] : [id number] [Interpreters_FullName] : Jimmy [TWNoteComboBox1] : Stateless

## 2024-10-01 NOTE — PHYSICAL EXAM
[Alert] : alert [Sclera] : the sclera and conjunctiva were normal [Normal Appearance] : the appearance of the neck was normal [No Respiratory Distress] : no respiratory distress [No Acc Muscle Use] : no accessory muscle use [Epigastric] : epigastric [Abnormal Walk] : normal gait [Normal Color / Pigmentation] : normal skin color and pigmentation [Oriented To Time, Place, And Person] : oriented to person, place, and time [de-identified] : left sided subcostal ttp as well as ttp over ribs on lower left side

## 2024-10-01 NOTE — PHYSICAL EXAM
[Alert] : alert [Sclera] : the sclera and conjunctiva were normal [Normal Appearance] : the appearance of the neck was normal [No Respiratory Distress] : no respiratory distress [No Acc Muscle Use] : no accessory muscle use [Epigastric] : epigastric [Abnormal Walk] : normal gait [Normal Color / Pigmentation] : normal skin color and pigmentation [Oriented To Time, Place, And Person] : oriented to person, place, and time [de-identified] : left sided subcostal ttp as well as ttp over ribs on lower left side

## 2024-10-01 NOTE — REVIEW OF SYSTEMS
[Abdominal Pain] : abdominal pain [Constipation] : constipation [Heartburn] : heartburn [Fever] : no fever [Chills] : no chills [Chest Pain] : no chest pain [Shortness Of Breath] : no shortness of breath [SOB on Exertion] : no shortness of breath during exertion [Vomiting] : no vomiting [Diarrhea] : no diarrhea [Melena (black stool)] : no melena [Bleeding] : no bleeding [Bloating (gassiness)] : no bloating [Skin Lesions] : no skin lesions [Confused] : no confusion [Easy Bleeding] : no tendency for easy bleeding

## 2024-10-01 NOTE — REASON FOR VISIT
[Initial Evaluation] : an initial evaluation [[employer] ] : provided by [employer]   [FreeTextEntry1] : iron deficiency anemia [Interpreters_IDNumber] : [id number] [Interpreters_FullName] : Jimmy [TWNoteComboBox1] : [place]

## 2024-10-01 NOTE — END OF VISIT
[] : Fellow [FreeTextEntry3] : As modified and discussed with patient MD CHAPIS Barekr [facility] Associate Professor of Medicine [facility]   [Time Spent: ___ minutes] : I have spent [unfilled] minutes of time on the encounter which excludes teaching and separately reported services.

## 2024-10-01 NOTE — HISTORY OF PRESENT ILLNESS
[FreeTextEntry1] : 44 y/o F from [place] with PMHx of asthma, iron deficiency anemia, hemangioma of the liver, cholelithiasis, persistent H pylori presents to GI clinic for evaluation of abdominal pain, H pylori infection, iron deficiency anemia.   #Abdominal pain: Patient states she is experiencing two different types of abdominal pain. She has been having burning epigastric pain for the past three months which occurs after every meal regardless of what she eats. It starts within a minute of eating, last an hour, and then goes away on its own. She does not take any medications. Patient states that this pain is not a/w nausea, vomiting, changes in bowel habits. Endorses long standing constipation (goes every 3 days, strains w/ BM). She denies weight loss, early satiety. She does endorse dysphagia with liquids but not solids which is long standing as well as frequent acid reflux. She is not on a PPI or other acid blocker. She has never had an EGD or colonoscopy. She also states she experiences stabbing LUQ/subcostal pain which wraps around to her back. It is not related to eating or movement. SHe does not have any other GI sx. The pain has been constantly there over the past 9 months but has been getting worse. She will take prn Tylenol for it.  #H pylori: Patient states she was first diagnosed in 2020 and has now been treated three times. Her last treatment was in June, and she submitted her stool sample for h pylori antigen testing yesterday.  #MARGI: Patient was told years ago that she had MARGI and she is currently taking daily iron supplements. Last Hgb noted to be wnl. She denies heavy menstruation, melena, hematochezia.  #Liver hemangioma: Patient has a liver lesion first seen on CT 2018, stable on repeat CT 2022. Patient currently ordered for non-CT A/P by her PCP for further evaluation, as it was again demonstrated on recent US (5/2024) which was performed for evaluation of abdominal pain. She was also found to have gallstones, but normal CBD.  Patient denies a family hx of CRC or other GI cancers.

## 2024-10-01 NOTE — HISTORY OF PRESENT ILLNESS
[FreeTextEntry1] : 44 y/o F from [place] with PMHx of asthma, iron deficiency anemia, hemangioma of the liver, cholelithiasis, persistent H pylori presents to GI clinic for evaluation of abdominal pain, H pylori infection, iron deficiency anemia.   #Abdominal pain: Patient states she is experiencing two different types of abdominal pain. She has been having burning epigastric pain for the past three months which occurs after every meal regardless of what she eats. It starts within a minute of eating, last an hour, and then goes away on its own. She does not take any medications. Patient states that this pain is not a/w nausea, vomiting, changes in bowel habits. Endorses long standing constipation (goes every 3 days, strains w/ BM). She denies weight loss, early satiety. She does endorse dysphagia with liquids but not solids which is long standing as well as frequent acid reflux. She is not on a PPI or other acid blocker. She has never had an EGD or colonoscopy. She also states she experiences stabbing LUQ/subcostal pain which wraps around to her back. It is not related to eating or movement. SHe does not have any other GI sx. The pain has been constantly there over the past 9 months but has been getting worse. She will take prn Tylenol for it.  #H pylori: Patient states she was first diagnosed in 2020 and has now been treated three times. Her last treatment was in June, and she submitted her stool sample for h pylori antigen testing yesterday.  #MARGI: Patient was told years ago that she had MARGI and she is currently taking daily iron supplements. Last Hgb noted to be wnl. She denies heavy menstruation, melena, hematochezia.  #Liver hemangioma: Patient has a liver lesion first seen on CT 2018, stable on repeat CT 2022. Patient currently ordered for non-CT A/P by her PCP for further evaluation, as it was again demonstrated on recent US (5/2024) which was performed for evaluation of abdominal pain. She was also found to have gallstones, but normal CBD.  Patient denies a family hx of CRC or other GI cancers.

## 2024-10-01 NOTE — END OF VISIT
[] : Fellow [FreeTextEntry3] : As modified and discussed with patient MD CHAPIS Barker [facility] Associate Professor of Medicine [facility]   [Time Spent: ___ minutes] : I have spent [unfilled] minutes of time on the encounter which excludes teaching and separately reported services.

## 2024-10-01 NOTE — ASSESSMENT
[FreeTextEntry1] : 44 y/o F from [place] with PMHx of asthma, iron deficiency anemia, hemangioma of the liver, cholelithiasis, persistent H pylori presents to GI clinic for evaluation of abdominal pain, H pylori infection, iron deficiency anemia.   #Abdominal pain: Patient with both epigastric pain and L subcostal pain. Epigastric pain w/ broad ddx, may be 2/2 PUD, biliary colic given gallstones seen on US, functional disease. L-sided pain likely MSK in nature given focal tenderness over ribs on exam #H pylori infection: Patient w/ + H pylori with three completed treatments, the last of which was 5/2024. Has submitted H pylori antigen testing which has not resulted yet. #Iron deficiency anemia, resolved with iron supplementation. Patient has never had a luminal evaluation.  #Dysphagia: Patient endorses dysphagia to liquids but not solids; had globus sensation in the past #Cholelithiasis #Hepatic hemangioma: Seen on recent US and prior CTs, pending CT for further eval ordered by [facility]  Plan - will plan for EGD/colonoscopy for evaluation of MARGI, epigastric pain, dysphagia. Will obtain gastric biopsies to assess for resolution of H.  pylori infection. Details of procedure and risks involved reviewed with patient in detail, including but not limited to bleeding, perforation, infection. She is amenable to proceeding. - PEG 3350 prep sent to pharmacy - advised to hold iron for week before procedure and to take daily miralax for week preceding procedure given constipation - patient was ordered for CT noncon by PCP for evaluation of hepatic lesion; changed to contrast study as noncon study likely to be of limited utility in this setting  Samantha Skaggs PGY7 GI/Hepatology Fellow D/w Dr. Person

## 2024-10-01 NOTE — ASSESSMENT
[FreeTextEntry1] : 42 y/o F from [place] with PMHx of asthma, iron deficiency anemia, hemangioma of the liver, cholelithiasis, persistent H pylori presents to GI clinic for evaluation of abdominal pain, H pylori infection, iron deficiency anemia.   #Abdominal pain: Patient with both epigastric pain and L subcostal pain. Epigastric pain w/ broad ddx, may be 2/2 PUD, biliary colic given gallstones seen on US, functional disease. L-sided pain likely MSK in nature given focal tenderness over ribs on exam #H pylori infection: Patient w/ + H pylori with three completed treatments, the last of which was 5/2024. Has submitted H pylori antigen testing which has not resulted yet. #Iron deficiency anemia, resolved with iron supplementation. Patient has never had a luminal evaluation.  #Dysphagia: Patient endorses dysphagia to liquids but not solids; had globus sensation in the past #Cholelithiasis #Hepatic hemangioma: Seen on recent US and prior CTs, pending CT for further eval ordered by [facility]  Plan - will plan for EGD/colonoscopy for evaluation of MARGI, epigastric pain, dysphagia. Will obtain gastric biopsies to assess for resolution of H.  pylori infection. Details of procedure and risks involved reviewed with patient in detail, including but not limited to bleeding, perforation, infection. She is amenable to proceeding. - PEG 3350 prep sent to pharmacy - advised to hold iron for week before procedure and to take daily miralax for week preceding procedure given constipation - patient was ordered for CT noncon by PCP for evaluation of hepatic lesion; changed to contrast study as noncon study likely to be of limited utility in this setting  Samantha Skaggs PGY7 GI/Hepatology Fellow D/w Dr. Person

## 2024-10-01 NOTE — END OF VISIT
[] : Fellow [FreeTextEntry3] : As modified and discussed with patient MD CHAPIS Barker [facility] Associate Professor of Medicine [facility]   [Time Spent: ___ minutes] : I have spent [unfilled] minutes of time on the encounter which excludes teaching and separately reported services.

## 2024-10-01 NOTE — ASSESSMENT
[FreeTextEntry1] : 42 y/o F from [place] with PMHx of asthma, iron deficiency anemia, hemangioma of the liver, cholelithiasis, persistent H pylori presents to GI clinic for evaluation of abdominal pain, H pylori infection, iron deficiency anemia.   #Abdominal pain: Patient with both epigastric pain and L subcostal pain. Epigastric pain w/ broad ddx, may be 2/2 PUD, biliary colic given gallstones seen on US, functional disease. L-sided pain likely MSK in nature given focal tenderness over ribs on exam #H pylori infection: Patient w/ + H pylori with three completed treatments, the last of which was 5/2024. Has submitted H pylori antigen testing which has not resulted yet. #Iron deficiency anemia, resolved with iron supplementation. Patient has never had a luminal evaluation.  #Dysphagia: Patient endorses dysphagia to liquids but not solids; had globus sensation in the past #Cholelithiasis #Hepatic hemangioma: Seen on recent US and prior CTs, pending CT for further eval ordered by [facility]  Plan - will plan for EGD/colonoscopy for evaluation of MARGI, epigastric pain, dysphagia. Will obtain gastric biopsies to assess for resolution of H.  pylori infection. Details of procedure and risks involved reviewed with patient in detail, including but not limited to bleeding, perforation, infection. She is amenable to proceeding. - PEG 3350 prep sent to pharmacy - advised to hold iron for week before procedure and to take daily miralax for week preceding procedure given constipation - patient was ordered for CT noncon by PCP for evaluation of hepatic lesion; changed to contrast study as noncon study likely to be of limited utility in this setting  Samantha Skaggs PGY7 GI/Hepatology Fellow D/w Dr. Person

## 2024-10-01 NOTE — PHYSICAL EXAM
[Alert] : alert [Sclera] : the sclera and conjunctiva were normal [Normal Appearance] : the appearance of the neck was normal [No Respiratory Distress] : no respiratory distress [No Acc Muscle Use] : no accessory muscle use [Epigastric] : epigastric [Abnormal Walk] : normal gait [Normal Color / Pigmentation] : normal skin color and pigmentation [Oriented To Time, Place, And Person] : oriented to person, place, and time [de-identified] : left sided subcostal ttp as well as ttp over ribs on lower left side

## 2024-10-01 NOTE — REASON FOR VISIT
[Initial Evaluation] : an initial evaluation [[employer] ] : provided by [employer]   [Interpreters_IDNumber] : [id number] [FreeTextEntry1] : iron deficiency anemia [Interpreters_FullName] : Jimmy [TWNoteComboBox1] : [place]

## 2024-10-03 ENCOUNTER — NON-APPOINTMENT (OUTPATIENT)
Age: 44
End: 2024-10-03

## 2025-02-08 NOTE — ED PROVIDER NOTE - EYES, MLM
Jose Alfredo Solis Patient Status:  Inpatient    1940 MRN [id number]   Location [facility] 8NE-A Attending Jovanni Villarreal MD   Hosp Day # 8 PCP MIKAYLA BAUGH, MD ROSEANN       Cardiology Nocturnal APN Note    Page Received: Primary RN    HPI:     Patient is a 85 year old male with PMH of CAD s/p CABG, atrial fibrillation-on amiodarone and Eliquis, HTN, HLD, and hypothyroidism who was admitted on 2025 for septic shock 2/2 UTI. Pt converted to atrial fibrillation with rapid ventricular response. IV Cardizem was started. [facility] consulted for afib with RVR. Echocardiogram completed on 2025 showed normal left ventricular cavity size and systolic function. EF 55-60%. HPI obtained from chart review and information provided by ED physician.             Assessment/Plan:    - Titrate IV Cardizem for heart rate control as appropriate  - Continue Eliquis  - Continue to monitor overnight on telemetry  - Formal cardiology consult to follow in AM.       DIAMOND Goodson  [facility]  2025  2:24 AM                 Clear bilaterally, pupils equal, round and reactive to light.